# Patient Record
Sex: MALE | Race: WHITE | Employment: OTHER | URBAN - NONMETROPOLITAN AREA
[De-identification: names, ages, dates, MRNs, and addresses within clinical notes are randomized per-mention and may not be internally consistent; named-entity substitution may affect disease eponyms.]

---

## 2017-02-09 ENCOUNTER — HOSPITAL ENCOUNTER (OUTPATIENT)
Dept: SLEEP CENTER | Age: 52
Discharge: HOME | End: 2017-02-09
Payer: COMMERCIAL

## 2017-02-09 PROCEDURE — G0399 HOME SLEEP TEST/TYPE 3 PORTA: HCPCS

## 2017-02-09 PROCEDURE — 95806 SLEEP STUDY UNATT&RESP EFFT: CPT | Performed by: PSYCHIATRY & NEUROLOGY

## 2017-02-19 DIAGNOSIS — G47.33 SLEEP APNEA, OBSTRUCTIVE: Primary | ICD-10-CM

## 2017-03-13 ENCOUNTER — TELEPHONE (OUTPATIENT)
Dept: NEUROLOGY | Age: 52
End: 2017-03-13

## 2017-04-12 ENCOUNTER — OFFICE VISIT (OUTPATIENT)
Dept: NEUROLOGY | Age: 52
End: 2017-04-12
Payer: COMMERCIAL

## 2017-04-12 VITALS
BODY MASS INDEX: 37.93 KG/M2 | SYSTOLIC BLOOD PRESSURE: 112 MMHG | WEIGHT: 280 LBS | RESPIRATION RATE: 16 BRPM | DIASTOLIC BLOOD PRESSURE: 75 MMHG | HEART RATE: 80 BPM | HEIGHT: 72 IN

## 2017-04-12 DIAGNOSIS — Z99.89 CPAP (CONTINUOUS POSITIVE AIRWAY PRESSURE) DEPENDENCE: ICD-10-CM

## 2017-04-12 DIAGNOSIS — G47.33 OBSTRUCTIVE SLEEP APNEA: Primary | ICD-10-CM

## 2017-04-12 PROCEDURE — 99214 OFFICE O/P EST MOD 30 MIN: CPT | Performed by: PHYSICIAN ASSISTANT

## 2017-04-13 ENCOUNTER — TELEPHONE (OUTPATIENT)
Dept: NEUROLOGY | Age: 52
End: 2017-04-13

## 2017-07-03 ENCOUNTER — HOSPITAL ENCOUNTER (EMERGENCY)
Age: 52
Discharge: HOME | End: 2017-07-03
Attending: EMERGENCY MEDICINE
Payer: COMMERCIAL

## 2017-07-03 VITALS
HEIGHT: 72 IN | OXYGEN SATURATION: 99 % | WEIGHT: 280 LBS | DIASTOLIC BLOOD PRESSURE: 72 MMHG | SYSTOLIC BLOOD PRESSURE: 128 MMHG | RESPIRATION RATE: 16 BRPM | TEMPERATURE: 97.9 F | HEART RATE: 80 BPM | BODY MASS INDEX: 37.93 KG/M2

## 2017-07-03 DIAGNOSIS — R10.10 PAIN OF UPPER ABDOMEN: Primary | ICD-10-CM

## 2017-07-03 DIAGNOSIS — K21.9 GASTROESOPHAGEAL REFLUX DISEASE, ESOPHAGITIS PRESENCE NOT SPECIFIED: ICD-10-CM

## 2017-07-03 LAB
ALBUMIN SERPL-MCNC: 5 G/DL (ref 3.5–5.2)
ALP BLD-CCNC: 62 U/L (ref 40–130)
ALT SERPL-CCNC: 23 U/L (ref 5–41)
AMYLASE: 177 U/L (ref 28–100)
ANION GAP SERPL CALCULATED.3IONS-SCNC: 17 MMOL/L (ref 7–19)
AST SERPL-CCNC: 21 U/L (ref 5–40)
BASOPHILS ABSOLUTE: 0.1 K/UL (ref 0–0.2)
BASOPHILS RELATIVE PERCENT: 0.8 % (ref 0–1)
BILIRUB SERPL-MCNC: 0.5 MG/DL (ref 0.2–1.2)
BILIRUBIN URINE: NEGATIVE
BLOOD, URINE: NEGATIVE
BUN BLDV-MCNC: 13 MG/DL (ref 6–20)
CALCIUM SERPL-MCNC: 9.8 MG/DL (ref 8.6–10)
CHLORIDE BLD-SCNC: 100 MMOL/L (ref 98–111)
CLARITY: CLEAR
CO2: 24 MMOL/L (ref 22–29)
COLOR: YELLOW
CREAT SERPL-MCNC: 1.4 MG/DL (ref 0.5–1.2)
EOSINOPHILS ABSOLUTE: 0.3 K/UL (ref 0–0.6)
EOSINOPHILS RELATIVE PERCENT: 2.6 % (ref 0–5)
GFR NON-AFRICAN AMERICAN: 53
GLUCOSE BLD-MCNC: 106 MG/DL (ref 74–109)
GLUCOSE URINE: NEGATIVE MG/DL
HCT VFR BLD CALC: 50.3 % (ref 42–52)
HEMOGLOBIN: 16.8 G/DL (ref 14–18)
KETONES, URINE: NEGATIVE MG/DL
LEUKOCYTE ESTERASE, URINE: NEGATIVE
LIPASE: 37 U/L (ref 13–60)
LITHIUM LEVEL: 0.6 MMOL/L (ref 0.6–1.2)
LYMPHOCYTES ABSOLUTE: 1.2 K/UL (ref 1.1–4.5)
LYMPHOCYTES RELATIVE PERCENT: 11.9 % (ref 20–40)
MCH RBC QN AUTO: 31.3 PG (ref 27–31)
MCHC RBC AUTO-ENTMCNC: 33.4 G/DL (ref 33–37)
MCV RBC AUTO: 93.7 FL (ref 80–94)
MONOCYTES ABSOLUTE: 0.6 K/UL (ref 0–0.9)
MONOCYTES RELATIVE PERCENT: 6.2 % (ref 0–10)
NEUTROPHILS ABSOLUTE: 8.1 K/UL (ref 1.5–7.5)
NEUTROPHILS RELATIVE PERCENT: 78 % (ref 50–65)
NITRITE, URINE: NEGATIVE
PDW BLD-RTO: 13.9 % (ref 11.5–14.5)
PH UA: 6
PLATELET # BLD: 219 K/UL (ref 130–400)
PMV BLD AUTO: 9.8 FL (ref 9.4–12.4)
POTASSIUM SERPL-SCNC: 3.9 MMOL/L (ref 3.5–5)
PROTEIN UA: NEGATIVE MG/DL
RBC # BLD: 5.37 M/UL (ref 4.7–6.1)
SODIUM BLD-SCNC: 141 MMOL/L (ref 136–145)
SPECIFIC GRAVITY UA: 1.02
TOTAL PROTEIN: 9.1 G/DL (ref 6.6–8.7)
TROPONIN: <0.01 NG/ML (ref 0–0.03)
UROBILINOGEN, URINE: 0.2 E.U./DL
WBC # BLD: 10.3 K/UL (ref 4.8–10.8)

## 2017-07-03 PROCEDURE — 80178 ASSAY OF LITHIUM: CPT

## 2017-07-03 PROCEDURE — 96374 THER/PROPH/DIAG INJ IV PUSH: CPT

## 2017-07-03 PROCEDURE — 80053 COMPREHEN METABOLIC PANEL: CPT

## 2017-07-03 PROCEDURE — S0028 INJECTION, FAMOTIDINE, 20 MG: HCPCS | Performed by: EMERGENCY MEDICINE

## 2017-07-03 PROCEDURE — 2500000003 HC RX 250 WO HCPCS: Performed by: EMERGENCY MEDICINE

## 2017-07-03 PROCEDURE — 93005 ELECTROCARDIOGRAM TRACING: CPT

## 2017-07-03 PROCEDURE — 85025 COMPLETE CBC W/AUTO DIFF WBC: CPT

## 2017-07-03 PROCEDURE — 6370000000 HC RX 637 (ALT 250 FOR IP): Performed by: EMERGENCY MEDICINE

## 2017-07-03 PROCEDURE — 99284 EMERGENCY DEPT VISIT MOD MDM: CPT

## 2017-07-03 PROCEDURE — 82150 ASSAY OF AMYLASE: CPT

## 2017-07-03 PROCEDURE — 81003 URINALYSIS AUTO W/O SCOPE: CPT

## 2017-07-03 PROCEDURE — 84484 ASSAY OF TROPONIN QUANT: CPT

## 2017-07-03 PROCEDURE — 36415 COLL VENOUS BLD VENIPUNCTURE: CPT

## 2017-07-03 PROCEDURE — 83690 ASSAY OF LIPASE: CPT

## 2017-07-03 PROCEDURE — 99283 EMERGENCY DEPT VISIT LOW MDM: CPT | Performed by: EMERGENCY MEDICINE

## 2017-07-03 RX ORDER — SUCRALFATE 1 G/1
1 TABLET ORAL 4 TIMES DAILY
Qty: 60 TABLET | Refills: 0 | Status: SHIPPED | OUTPATIENT
Start: 2017-07-03 | End: 2017-10-12 | Stop reason: ALTCHOICE

## 2017-07-03 RX ADMIN — FAMOTIDINE 20 MG: 10 INJECTION, SOLUTION INTRAVENOUS at 21:47

## 2017-07-03 RX ADMIN — Medication 30 ML: at 21:47

## 2017-07-03 ASSESSMENT — ENCOUNTER SYMPTOMS
SORE THROAT: 0
RHINORRHEA: 0
COUGH: 0
EYES NEGATIVE: 1
WHEEZING: 0
NAUSEA: 1
DIARRHEA: 0
VOMITING: 0
CHEST TIGHTNESS: 0
ABDOMINAL PAIN: 1
SHORTNESS OF BREATH: 0
BACK PAIN: 0

## 2017-07-03 ASSESSMENT — PAIN SCALES - GENERAL
PAINLEVEL_OUTOF10: 0
PAINLEVEL_OUTOF10: 7

## 2017-07-03 ASSESSMENT — PAIN DESCRIPTION - LOCATION: LOCATION: ABDOMEN

## 2017-07-03 ASSESSMENT — PAIN DESCRIPTION - ORIENTATION: ORIENTATION: UPPER

## 2017-07-03 ASSESSMENT — PAIN DESCRIPTION - DESCRIPTORS: DESCRIPTORS: ACHING

## 2017-07-10 LAB
EKG P AXIS: 61 DEGREES
EKG P-R INTERVAL: 184 MS
EKG Q-T INTERVAL: 428 MS
EKG QRS DURATION: 104 MS
EKG QTC CALCULATION (BAZETT): 435 MS
EKG T AXIS: 33 DEGREES

## 2017-07-13 ENCOUNTER — HOSPITAL ENCOUNTER (EMERGENCY)
Age: 52
Discharge: LEFT BEFORE DISCHARGE | End: 2017-07-13
Payer: COMMERCIAL

## 2017-07-13 PROCEDURE — 4500000002 HC ER NO CHARGE

## 2017-10-12 ENCOUNTER — OFFICE VISIT (OUTPATIENT)
Dept: NEUROLOGY | Age: 52
End: 2017-10-12
Payer: COMMERCIAL

## 2017-10-12 VITALS
WEIGHT: 284 LBS | DIASTOLIC BLOOD PRESSURE: 84 MMHG | SYSTOLIC BLOOD PRESSURE: 135 MMHG | HEIGHT: 72 IN | HEART RATE: 69 BPM | OXYGEN SATURATION: 96 % | BODY MASS INDEX: 38.47 KG/M2

## 2017-10-12 DIAGNOSIS — G47.33 OBSTRUCTIVE SLEEP APNEA: Primary | ICD-10-CM

## 2017-10-12 DIAGNOSIS — Z99.89 BIPAP (BIPHASIC POSITIVE AIRWAY PRESSURE) DEPENDENCE: ICD-10-CM

## 2017-10-12 PROCEDURE — 99213 OFFICE O/P EST LOW 20 MIN: CPT | Performed by: PHYSICIAN ASSISTANT

## 2017-10-12 RX ORDER — PIOGLITAZONEHYDROCHLORIDE 30 MG/1
15 TABLET ORAL DAILY
Refills: 0 | COMMUNITY
Start: 2017-09-19

## 2017-10-12 NOTE — PROGRESS NOTES
[facility] Sleep Follow Up Encounter      Information:   Patient Name: Jaylin Kelley  :   1965  Age:   45 y.o. MRN:   [id number]  Account #:  [de-identified]  Today:                10/12/17    Provider:  Andrew Kan PA-C    Chief Complaint   Patient presents with    Sleep Apnea     Patient states he is doing well. Subjective:   Jaylin Kelley is a 45 y.o. man  with a history of severe UMM who comes in for a sleep clinic follow up. He was initially diagnosed with UMM in  and was prescribed CPAP and was intolerant to the pressure and stopped using it. He had persistent symptoms of UMM and decided to be re-evaluated. He was later referred for an HST. The HST, 2017 revealed an AHI of 73.3. He was prescribed auto CPAP but he continued to use old device, which is a BiPAP according to the download. Today he reports that he is using BiPAP 9  hours per night. He sleeps 9 hours per night as well. The pre-PAP symptoms have improved. He has some drowsiness in the morning which is alleviated by coffee.  He does not fall asleep when driving.        Location or symptom:  UMM  Onset:  PS and HST:  2017  Timing:  q hs  Severity:  Severe  Associated:  Snoring, witnessed apneas, and excessive daytime somnolence  Alleviated:  CPAP      Objective:     Past Medical History:   Diagnosis Date    BiPAP (biphasic positive airway pressure) dependence     20cm/15cm    Bipolar 1 disorder (HCC)     Depression     GERD (gastroesophageal reflux disease)     Hyperlipidemia     Hypertension     pt staes he takes no bp medication    Obstructive sleep apnea     AHI:  73.3 per HST, 2017    Type II or unspecified type diabetes mellitus without mention of complication, not stated as uncontrolled     Unspecified sleep apnea        Past Surgical History:   Procedure Laterality Date    COLONOSCOPY  2012    Dr Ghulam Pretty 2016    Dr Paige-external hemorrhoids, tubular AP (-) dysplasia x 2--5 yr []Abdominal Pain  []Blood in Stool  []Diarrhea []Constipation []Nausea  []Vomiting  [x] Denies all unless marked  Genitourinary:  [] Dysuria [] Frequency  [] Incontinence [] Urgency   [x] Denies all unless marked  Musculoskeletal: [] Arthralgia  [] Myalgias [] Muscle cramps  [] Muscle twitches   [x] Denies all unless marked   Extremities:   [] Pain   [] Swelling   [x] Denies all unless marked  Skin:[] Rash  [] Color Change  [x] Denies all unless marked  Neurological:[] Visual Disturbance [] Double Vision [] Slurred Speech [] Trouble swallowing  [] Vertigo [] Tingling [] Numbness [] Weakness [] Loss of Balance   [] Loss of Consciousness [] Memory Loss  [x] Denies all unless marked  Psychiatric/Behavioral:[x] Depression [x] Anxiety  [] Denies all unless marked  Sleep: []  Insomnia [] Sleep Disturbance [] Snoring [] Restless Legs [] Daytime Sleepiness [] Sleep Apnea  [x] Denies all unless marked       Examination:  Vitals:  /84   Pulse 69   Ht 6' (1.829 m)   Wt 284 lb (128.8 kg)   SpO2 96%   BMI 38.52 kg/m²   General appearance:  Appears healthy. Alert; in no acute distress. Pleasant. , alert and cooperative with exam  HEENT:  PERRLA, EOMI and Neck supple with midline trachea  Heart[de-identified]  regular rate and rhythm, S1, S2 normal, no murmur, click, rub or gallop  Lungs:  clear to auscultation bilaterally  Extremities:  extremities normal, atraumatic, no cyanosis or edema  Neurologic:  Extraocular movements are intact without nystagmus. Visual fields are full to confrontation. Facial movements are symmetrical and normal.  Speech is precise. Extremity strength is normal in both uppers and lowers. Deep tendon reflexes are intact and symmetrical.  Rapid alternating movements are unimpaired. Finger-to-nose testing is performed well, without dysmetria. Gait is normal.    I reviewed the following studies:    Compliance download:  He has an auto CPAP set at a pressure range of 10cm to 20cm.   Compliance download shows that he uses device:  100% of the time;  percentage of days with usage >=4 hours:  97%. AHI:  0.3    Assessment:       ICD-10-CM ICD-9-CM    1. Obstructive sleep apnea G47.33 327.23    2. BiPAP (biphasic positive airway pressure) dependence Z99.89 V46.8           []  :  Stable     []  :  Improved                       [x]  :  Well controlled              []  :  Resolving     []  :  Resolved     []  :  Inadequately controlled     []  :  Worsening    Patient is compliant and benefiting from therapy as indicated by compliance evaluation and patient report. Plan:     No orders of the defined types were placed in this encounter. 1.   Patient advised of the etiology,  pathophysiology, diagnosis, treatment options, and risks of untreated UMM. Risks may include, but are not limited to  hypertension, coronary artery disease, diabetes, stroke, weight gain, impaired cognition, daytime somnolence,  and motor vehicle accidents. Advised to abstain from driving or operating heavy machinery when drowsy and the use of respiratory suppressants. 2.  The following educational material has been included in this visit after visit summary for your review: UMM/PAP guidelines-Discussed with the patient and all questions fully answered. 3.  The current medical regimen is effective;  continue present plan. 4.  Continue BiPAP  5. Follow up in 1 year      Note:  A total of >50% (>8 minutes) of 15 minutes was spent discussing the pathophysiology and treatment and/or coordination of care of the above diagnoses.

## 2017-10-12 NOTE — PATIENT INSTRUCTIONS
What is sleep apnea?  Sleep apnea is a condition that makes you stop breathing for short periods while you are asleep. There are 2 types of sleep apnea. One is called \"obstructive sleep apnea,\" and the other is called \"central sleep apnea. \"  In obstructive sleep apnea, you stop breathing because your throat narrows or closes. In central sleep apnea, you stop breathing because your brain does not send the right signals to your muscles to make you breathe. When people talk about sleep apnea, they are usually referring to obstructive sleep apnea, which is what this article is about. People with sleep apnea do not know that they stop breathing when they are asleep. But they do sometimes wake up startled or gasping for breath. They also often hear from loved ones that they snore. What are the symptoms of sleep apnea?  The main symptoms of sleep apnea are loud snoring, tiredness, and daytime sleepiness. Other symptoms can include:  ?Restless sleep  ? Waking up choking or gasping  ? Morning headaches, dry mouth, or sore throat  ? Waking up often to urinate  ? Waking up feeling unrested or groggy  ? Trouble thinking clearly or remembering things  Some people with sleep apnea don't have symptoms, or they don't know they have them. They might figure that it's normal to be tired or to snore a lot. Should I see a doctor or nurse?  Yes. If you think you might have sleep apnea, see your doctor. Is there a test for sleep apnea?  Yes. If your doctor or nurse suspects you have sleep apnea, he or she might send you for a \"sleep study. \" Sleep studies can sometimes be done at home, but they are usually done in a sleep lab. For the study, you spend the night in the lab, and you are hooked up to different machines that monitor your heart rate, breathing, and other body functions. The results of the test will tell your doctor or nurse if you have the disorder. Is there anything I can do on my own to help my sleep apnea? other serious heart problems. In people with severe sleep apnea, getting treated (for example, with a CPAP machine) can help prevent some of these problems. Important information:  Medicare/private insurance CPAP/BiPAP/APAP requirements:  Medicare/private insurance has specific requirements for PAP compliance that must be met during the first 90 days of use to continue coverage for CPAP/BiPAP/APAP  from day 91 and beyond. The policy requires that patients use a PAP device 4 hours per 24 hour period, at least 70% of the time over a 30 day period. This data must be downloaded as a report direct from the PAP devices. This is called a compliance download. Your PAP supplier will assist you in this matter. Reminder:  Please bring a copy of the compliance download to your next office visit or have your supplier fax it to our office prior to your office visit. Note:  Where applicable, we will utilize PAP device efficiency reports, additional testing, and face-to-face  clinical evaluation subsequent to any treatment, changes in treatment, and continued treatment. Caution:  Please abstain from driving or engaging in other activities which may be hazardous in the presence of diminished alertness or daytime drowsiness. And avoid the use of sedatives or alcohol, which can worsen sleep apnea and daytime drowsiness. Mask suggestions:  - Resmed Airfit N20 (Nasal) or F20 (Full face mask). They conform to your face, thus decreasing the potential for mask leakage. You might like the [employer] (full face mask). It has a \"memory foam\" like cushion. You might also like to try a nasal mask called a Dreamwear nasal mask or the Dreamwear nasal pillow. One other suggestion is the [facility], it is a minimal contact full face mask.   The [employer] incredible under the nose design makes it the only full face mask that won't cause red marks on the bridge of your nose when compared to other Full Face

## 2018-10-11 NOTE — PROGRESS NOTES
BY MOUTH TWICE A DAY WITH FOOD  3    FISH OIL 2,000 mg by Does not apply route 2 times daily       Ascorbic Acid (VITAMIN C) 500 MG tablet Take 1,000 mg by mouth daily        No current facility-administered medications for this visit. Allergies:  Neosporin [bacitracin-neomycin-polymyxin]    REVIEW OF SYSTEMS     Constitutional: []Fever []Sweats []Chills [] Recent Injury   [x] Denies all unless marked  HENT:[]Headache  [] Head Injury  [] Sore Throat  [] Ear Pain  [] Dizziness [] Hearing Loss   [x] Denies all unless marked  Spine:  [] Neck pain  [] Back pain  [] Sciaticia  [x] Denies all unless marked  Cardiovascular:[]Chest Pain []Palpitations [] Heart Disease  [x] Denies all unless marked  Pulmonary: []Shortness of Breath []Cough   [x] Denies all unless marked  Gastrointestinal:  []Abdominal Pain  []Blood in Stool  []Diarrhea []Constipation []Nausea  []Vomiting  [x] Denies all unless marked  Genitourinary:  [] Dysuria [] Frequency  [] Incontinence [] Urgency   [x] Denies all unless marked  Musculoskeletal: [] Arthralgia  [] Myalgias [] Muscle cramps  [] Muscle twitches   [x] Denies all unless marked   Extremities:   [] Pain   [] Swelling   [x] Denies all unless marked  Skin:[] Rash  [] Color Change  [x] Denies all unless marked  Neurological:[] Visual Disturbance [] Double Vision [] Slurred Speech [] Trouble swallowing  [] Vertigo [] Tingling [] Numbness [] Weakness [] Loss of Balance   [] Loss of Consciousness [] Memory Loss [] Seizures  [x] Denies all unless marked  Psychiatric/Behavioral:[] Depression [] Anxiety  [x] Denies all unless marked  Sleep: []  Insomnia [] Sleep Disturbance [] Snoring [] Restless Legs [x] Daytime Sleepiness [x] Sleep Apnea  [x] Denies all unless marked    The MA has completed the ROS with the patient. I have reviewed it in its' entirety with the patient and agree with the documentation.        PHYSICAL EXAM  /72   Pulse 109   Ht 6' (1.829 m)   Wt 294 lb (133.4 kg) complication, without long-term current use of insulin (HCC) E11.9 Hemoglobin A1C          []  :  Stable     []  :  Improved                       []  :  Well controlled              []  :  Resolving     []  :  Resolved     []  :  Inadequately controlled     []  :  Worsening     [x]  :  Additional workup planned    Patient is compliant and benefiting from therapy as indicated by compliance evaluation but with recurrent daytime somnolence-discussed other etiologies for daytime fatigue at length. Plan:     No orders of the defined types were placed in this encounter. 1.   Patient advised of the etiology,  pathophysiology, diagnosis, treatment options, and risks of untreated UMM. Risks may include, but are not limited to  hypertension, coronary artery disease, diabetes, stroke, weight gain, impaired cognition, daytime somnolence,  and motor vehicle accidents. Advised to abstain from driving or operating heavy machinery when drowsy and the use of respiratory suppressants. 2.  The following educational material has been included in this visit after visit summary for your review: UMM/PAP guidelines/diabetic diet-Discussed with the patient and all questions fully answered. 3.  Order-labs (he will follow up with Dr. Sosa Mask  4. Discussed diabetic diet and discontinuing the frosted flakes for breakfast  5. Continue CPAP  6. Consider an in-lab PSG (split-night) to reassess if symptoms persist  7. Follow up in 1 year-pt request but he will follow up sooner if EDS persists      Note:  A total of >50% (>13 minutes) of 25 minutes was spent discussing the pathophysiology and treatment and/or coordination of care of the above diagnoses.

## 2018-10-12 ENCOUNTER — OFFICE VISIT (OUTPATIENT)
Dept: NEUROLOGY | Age: 53
End: 2018-10-12
Payer: COMMERCIAL

## 2018-10-12 VITALS
HEART RATE: 109 BPM | HEIGHT: 72 IN | DIASTOLIC BLOOD PRESSURE: 72 MMHG | WEIGHT: 294 LBS | OXYGEN SATURATION: 95 % | SYSTOLIC BLOOD PRESSURE: 105 MMHG | BODY MASS INDEX: 39.82 KG/M2

## 2018-10-12 DIAGNOSIS — E34.9 TESTOSTERONE DEFICIENCY: ICD-10-CM

## 2018-10-12 DIAGNOSIS — G47.33 OBSTRUCTIVE SLEEP APNEA: Primary | ICD-10-CM

## 2018-10-12 DIAGNOSIS — R40.0 SOMNOLENCE, DAYTIME: ICD-10-CM

## 2018-10-12 DIAGNOSIS — Z99.89 BIPAP (BIPHASIC POSITIVE AIRWAY PRESSURE) DEPENDENCE: ICD-10-CM

## 2018-10-12 DIAGNOSIS — E11.9 TYPE 2 DIABETES MELLITUS WITHOUT COMPLICATION, WITHOUT LONG-TERM CURRENT USE OF INSULIN (HCC): ICD-10-CM

## 2018-10-12 PROCEDURE — 99214 OFFICE O/P EST MOD 30 MIN: CPT | Performed by: PHYSICIAN ASSISTANT

## 2018-10-12 NOTE — PATIENT INSTRUCTIONS
takes several deep breaths to catch up on breathing. As the person awakens, he or she may move briefly, snort or snore, and take a deep breath. Less frequently, a person may awaken completely with a sensation of gasping, smothering, or choking. If the person falls back to sleep quickly, he or she will not remember the event. Many people with sleep apnea are unaware of their abnormal breathing in sleep, and all patients underestimate how often their sleep is interrupted. Awakening from sleep causes sleep to be unrefreshing and causes fatigue and daytime sleepiness. Anatomic causes of obstructive sleep apnea --  Most patients have UMM because of a small upper airway. As the bones of the face and skull develop, some people develop a small lower face, a small mouth, and a tongue that seems too large for the mouth. These features are genetically determined, which explains why UMM tends to cluster in families. Obesity is another major factor. Tonsil enlargement can be an important cause, especially in children. SLEEP APNEA SYMPTOMS -- The main symptoms of UMM are loud snoring, fatigue, and daytime sleepiness. However, some people have no symptoms. For example, if the person does not have a bed partner, he or she may not be aware of the snoring. Fatigue and sleepiness have many causes and are often attributed to overwork and increasing age. As a result, a person may be slow to recognize that they have a problem. A bed partner or spouse often prompts the patient to seek medical care. Other symptoms may include one or more of the following:  ?Restless sleep  ? Awakening with choking, gasping, or smothering  ? Morning headaches, dry mouth, or sore throat  ? Waking frequently to urinate  ? Awakening unrested, groggy  ? Low energy, difficulty concentrating, memory impairment    Risk factors -- Certain factors increase the risk of sleep apnea.   ?Increasing age - UMM occurs at all ages, but it is more common in middle and period. This data must be downloaded as a report direct from the PAP devices. This is called a compliance download. Your PAP supplier will assist you in this matter. Reminder:  Please bring a copy of the compliance download to your next office visit or have your supplier fax it to our office prior to your office visit. Note:  Where applicable, we will utilize PAP device efficiency reports, additional testing, and face-to-face  clinical evaluation subsequent to any treatment, changes in treatment, and continued treatment. Caution:  Please abstain from driving or engaging in other activities which may be hazardous in the presence of diminished alertness or daytime drowsiness. And avoid the use of sedatives or alcohol, which can worsen sleep apnea and daytime drowsiness. Mask suggestions:  - Resmed Airfit N20 (Nasal) or F20 (Full face mask). They conform to your face, thus decreasing the potential for mask leakage. You might like the [employer] (full face mask). It has a \"memory foam\" like cushion. You might also like to try a nasal mask called a Dreamwear nasal mask or the Dreamwear nasal pillow. One other suggestion is the [facility], it is a minimal contact full face mask. The [employer] [place] incredible under the nose design makes it the only full face mask that won't cause red marks on the bridge of your nose when compared to other full face masks. The newest mask available is a Dreamwear full face mask. It has a soft feel, unique in-frame air-flow, and innovative air tube connection at the top of the head for the ultimate in sleep comfort. Patient Education        Learning About Diabetes Food Guidelines  Your Care Instructions    Meal planning is important to manage diabetes. It helps keep your blood sugar at a target level (which you set with your doctor). You don't have to eat special foods. You can eat what your family eats, including sweets once in a while.  But you do have to pay

## 2019-05-15 ENCOUNTER — HOSPITAL ENCOUNTER (OUTPATIENT)
Dept: ULTRASOUND IMAGING | Age: 54
Discharge: HOME | End: 2019-05-15
Payer: COMMERCIAL

## 2019-05-15 DIAGNOSIS — R79.89 ELEVATED SERUM CREATININE: ICD-10-CM

## 2019-05-15 PROCEDURE — 76770 US EXAM ABDO BACK WALL COMP: CPT

## 2019-10-10 ENCOUNTER — TELEPHONE (OUTPATIENT)
Dept: NEUROLOGY | Age: 54
End: 2019-10-10

## 2019-10-14 ENCOUNTER — OFFICE VISIT (OUTPATIENT)
Dept: NEUROLOGY | Age: 54
End: 2019-10-14
Payer: COMMERCIAL

## 2019-10-14 VITALS
WEIGHT: 277 LBS | HEIGHT: 72 IN | DIASTOLIC BLOOD PRESSURE: 81 MMHG | OXYGEN SATURATION: 95 % | BODY MASS INDEX: 37.52 KG/M2 | SYSTOLIC BLOOD PRESSURE: 124 MMHG | HEART RATE: 77 BPM

## 2019-10-14 DIAGNOSIS — G47.33 OBSTRUCTIVE SLEEP APNEA: Primary | ICD-10-CM

## 2019-10-14 DIAGNOSIS — Z99.89 BIPAP (BIPHASIC POSITIVE AIRWAY PRESSURE) DEPENDENCE: ICD-10-CM

## 2019-10-14 PROCEDURE — 99213 OFFICE O/P EST LOW 20 MIN: CPT | Performed by: PHYSICIAN ASSISTANT

## 2019-10-14 RX ORDER — IRBESARTAN 75 MG/1
TABLET ORAL
COMMUNITY
Start: 2019-09-03

## 2020-10-14 ENCOUNTER — OFFICE VISIT (OUTPATIENT)
Dept: NEUROLOGY | Age: 55
End: 2020-10-14
Payer: COMMERCIAL

## 2020-10-14 VITALS
DIASTOLIC BLOOD PRESSURE: 68 MMHG | BODY MASS INDEX: 35.21 KG/M2 | RESPIRATION RATE: 20 BRPM | HEIGHT: 72 IN | HEART RATE: 68 BPM | WEIGHT: 260 LBS | SYSTOLIC BLOOD PRESSURE: 103 MMHG

## 2020-10-14 PROCEDURE — 99214 OFFICE O/P EST MOD 30 MIN: CPT | Performed by: PHYSICIAN ASSISTANT

## 2020-10-14 NOTE — PATIENT INSTRUCTIONS
Patient education: Sleep apnea in adults       INTRODUCTION -- Normally during sleep, air moves through the throat and in and out of the lungs at a regular rhythm. In a person with sleep apnea, air movement is periodically diminished or stopped. There are two types of sleep apnea: obstructive sleep apnea and central sleep apnea. In obstructive sleep apnea, breathing is abnormal because of narrowing or closure of the throat. In central sleep apnea, breathing is abnormal because of a change in the breathing control and rhythm. Sleep apnea is a serious condition that can affect a person's ability to safely perform normal daily activities and can affect long term health. Approximately 25 percent of adults are at risk for sleep apnea of some degree. Men are more commonly affected than women. Other risk factors include middle and older age, being overweight or obese, and having a small mouth and throat. This topic review focuses on the most common type of sleep apnea in adults, obstructive sleep apnea (UMM). HOW SLEEP APNEA OCCURS -- The throat is surrounded by muscles that control the airway for speaking, swallowing, and breathing. During sleep, these muscles are less active, and this causes the throat to narrow. In most people, this narrowing does not affect breathing. In others, it can cause snoring, sometimes with reduced or completely blocked airflow. A completely blocked airway without airflow is called an obstructive apnea. Partial obstruction with diminished airflow is called a hypopnea. A person may have apnea and hypopnea during sleep. Insufficient breathing due to apnea or hypopnea causes oxygen levels to fall and carbon dioxide to rise. Because the airway is blocked, breathing faster or harder does not help to improve oxygen levels until the airway is reopened. Typically, the obstruction requires the person to awaken to activate the upper airway muscles.  Once the airway is opened, the person then takes several deep breaths to catch up on breathing. As the person awakens, he or she may move briefly, snort or snore, and take a deep breath. Less frequently, a person may awaken completely with a sensation of gasping, smothering, or choking. If the person falls back to sleep quickly, he or she will not remember the event. Many people with sleep apnea are unaware of their abnormal breathing in sleep, and all patients underestimate how often their sleep is interrupted. Awakening from sleep causes sleep to be unrefreshing and causes fatigue and daytime sleepiness. Anatomic causes of obstructive sleep apnea --  Most patients have UMM because of a small upper airway. As the bones of the face and skull develop, some people develop a small lower face, a small mouth, and a tongue that seems too large for the mouth. These features are genetically determined, which explains why UMM tends to cluster in families. Obesity is another major factor. Tonsil enlargement can be an important cause, especially in children. SLEEP APNEA SYMPTOMS -- The main symptoms of UMM are loud snoring, fatigue, and daytime sleepiness. However, some people have no symptoms. For example, if the person does not have a bed partner, he or she may not be aware of the snoring. Fatigue and sleepiness have many causes and are often attributed to overwork and increasing age. As a result, a person may be slow to recognize that they have a problem. A bed partner or spouse often prompts the patient to seek medical care. Other symptoms may include one or more of the following:  ?Restless sleep  ? Awakening with choking, gasping, or smothering  ? Morning headaches, dry mouth, or sore throat  ? Waking frequently to urinate  ? Awakening unrested, groggy  ? Low energy, difficulty concentrating, memory impairment    Risk factors -- Certain factors increase the risk of sleep apnea.   ?Increasing age - UMM occurs at all ages, but it is more common in middle and weight loss is only 5 percent, meaning that 95 percent of people regain lost weight. Avoid alcohol and other sedatives -- Alcohol can worsen sleepiness, potentially increasing the risk of accidents or injury. People with UMM are often counseled to drink little to no alcohol, even during the daytime. Similarly, people who take anti-anxiety medications or sedatives to sleep should speak with their healthcare provider about the safety of these medications. People with UMM must notify all healthcare providers, including surgeons, about their condition and the potential risks of being sedated. People with UMM who are given anesthesia and/or pain medications require special management and close monitoring to reduce the risk of a blocked airway. Dental devices -- A dental device, called an oral appliance or mandibular advancement device, can reposition the jaw (mandible), bringing the tongue and soft palate forward as well. This may relieve obstruction in some people. This treatment is excellent for reducing snoring, although the effect on MUM is sometimes more limited. As a result, dental devices are best used for mild cases of UMM when relief of snoring is the main goal. Failure to tolerate and accept CPAP is another indication for dental devices. While dental devices are not as effective as CPAP for UMM, some patients prefer a dental device to CPAP. Side effects of dental devices are generally minor but may include changes to the bite with prolonged use. Surgical treatment -- Surgery is an alternative therapy for patients who cannot tolerate or do not improve with nonsurgical treatments such as CPAP or oral devices. Surgery can also be used in combination with other nonsurgical treatments. Surgical procedures reshape structures in the upper airways or surgically reposition bone or soft tissue. Uvulopalatopharyngoplasty (UPPP) removes the uvula and excessive tissue in the throat, including the tonsils, if present. Other procedures, such as maxillomandibular advancement (MMA), address both the upper and lower pharyngeal airway more globally. UPPP alone has limited success rates (less than 50 percent) and people can relapse (when UMM symptoms return after surgery). As a result, this surgery is only recommended in a minority of people and should be considered with caution. MMA may have a higher success rate, particularly in people with abnormal jaw (maxilla and mandible) anatomy, but it is the most complicated procedure. A newer surgical approach, nerve stimulation to protrude the tongue, has promising success rates in very selected people. Tracheostomy creates a permanent opening in the neck. It is reserved for people with severe disease in whom less drastic measures have failed or are inappropriate. Although it is always successful in eliminating obstructive sleep apnea, tracheostomy requires significant lifestyle changes and carries some serious risks (eg, infection, bleeding, blockage). All surgical treatments require discussions about the goals of treatment, the expected outcomes, and potential complications. Hypoglossal nerve stimulator- \"Inspire\" device    PAP treatment failure:  Possible causes of treatment failure include nonadherence or suboptimal adherence, weight gain, an inappropriate level of prescribed positive pressure, or an additional disorder causing sleepiness (eg, narcolepsy) that may require alterations in the therapeutic regimen. A review of medications should also be undertaken since many drugs may lead to sleepiness. Inadequate sleep time may also negate the expected effects from treatment of UMM. Also, pt's can have persistent hypersomnolence associated with sleep apnea even in the presence of adequate therapy and at those times Provigil or Nuvigil or other stimulants may be indicated.     Once the patient's positive airway pressure therapy has been optimized and symptoms resolved, a regimen of long-term follow-up should be established. Annual visits are reasonable, with more frequent visits in between if new issues arise. The purpose of long-term follow-up is to assess usage and monitor for recurrent UMM, new side effects, air leakage, and fluctuations in body weight. WHERE TO GET MORE INFORMATION -- Your healthcare provider is the best source of information for questions and concerns related to your medical problem. Organizations  American Sleep Apnea Association  Provides information about sleep apnea to the public, publishes a newsletter, and serves as an advocate for people with the disorder. [place], [address], [address] [place] [address]   Mike@[email or web address]. org   [email or web address]   Tel: [phone]   Fax: [facility] organization that works to [employer] and safety by promoting public understanding of sleep and sleep disorders. Supports sleep-related education, research, and advocacy; produces and distributes educational materials to the public and healthcare professionals; and offers postdoctoral fellowships and grants for sleep researchers. [address]ney[address]   Serina@[email or web address]. org   [email or web address] org   Tel: [phone]   Fax: [phone]    Important information:  Medicare/private insurance CPAP/BiPAP/APAP requirements:  Medicare/private insurance has specific requirements for PAP compliance that must be met during the first 90 days of use to continue coverage for CPAP/BiPAP/APAP  from day 91 and beyond. The policy requires that patients use a PAP device 4 hours per 24 hour period, at least 70% of the time over a 30 day period. This data must be downloaded as a report direct from the PAP devices. This is called a compliance download. Your PAP supplier will assist you in this matter.      Reminder:  Please bring a copy of the compliance download to your next office visit or have your supplier fax it to our office prior to your office visit. Note:  Where applicable, we will utilize PAP device efficiency reports, additional testing, and face-to-face  clinical evaluation subsequent to any treatment, changes in treatment, and continued treatment. Caution:  Please abstain from driving or engaging in other activities which may be hazardous in the presence of diminished alertness or daytime drowsiness. And avoid the use of sedatives or alcohol, which can worsen sleep apnea and daytime drowsiness. Mask suggestions:  -     Resmed Airfit N20 (Nasal) or F20 (Full face mask). They conform to your face, thus decreasing the potential for mask leakage. You might like the AirTouch F20(full face mask). It has a \"memory foam\" like cushion. The AirFit F30 is a smaller style full face mask designed to sit low on and cover less of your face for fewer facial marks. AirFit N30i has a top of the head tube with a nasal mask. AirFit P10 reported to be the most comfortable nasal pillow mask. Resmed Mirage FX reported to be the most comfortable nasal mask. Resmed Mirage [place] reported to be the most comfortable hybrid mask. AirTouch N20-memory foam nasal mask. Respironics: You might also like to try a nasal mask called a Dreamwear nasal mask or the Dreamwear nasal pillow. Another suggestion is the [facility], it is a minimal contact full face mask. The Arnett Bill incredible under the nose design makes it the only full face mask that won't cause red marks on the bridge of your nose when compared to other full face masks. The Dreamwear full face mask has a  soft feel, unique in-frame air-flow, and innovative air tube connection at the top of the head for the ultimate in sleep comfort. Comfort Gel Blue. Dreamwear gel pillows.     Narinder & Jah: Roxy Craft nasal pillow mask and Simplus FFM    The use of a memory foam CPAP pillow supports the head and neck throughout the night.

## 2020-10-14 NOTE — PROGRESS NOTES
[facility] Sleep Follow Up Encounter      Information:   Patient Name: Kali Rice  :   1965  Age:   60 y.o. MRN:   [id number]  Account #:  [de-identified]  Today:                10/14/20    Provider:  Mitch Niño PA-C    Chief Complaint   Patient presents with    Follow-up    Sleep Apnea        Subjective:   Kali Rice is a 60 y.o. male  with a history of severe UMM  who comes in for an annual sleep clinic follow up. In review, he was initially diagnosed with UMM in  and was prescribed CPAP and was intolerant to the pressure and stopped using it. He had persistent symptoms of UMM and decided to be re-evaluated. He was later referred for an HST. The HST, 2017 revealed an AHI of 73.3.  He was prescribed auto CPAP but he continued to use old device, which is a BiPAP according to the download. He thinks that the BiPAP is greater than 8years old. The BiPAP is set at a pressure of 20cm/15cm. The compliance report indicates that he is averaging 10.5  hours of BiPAP use per day. He sleeps 9 hours per night. He uses nasal mask and he opened his mouth. He has started using a chin strap. He has frequent awakenings from 4:00 to  6:00. He c/o excessive daytime somnolence. His sleep is non-restorative. The EDS starts at 8:00 am. He falls asleep at his computer at work. He does not fall asleep while driving. By mid-day the daytime somnolence improves until the evening. The RLS is stable with clonazepam. It sometimes recurs but usually it is stable. The current BiPAP is outdated. Location or symptom:  UMM  Onset:  PS and HST:  2017  Timing:  q hs  Severity:  Severe  Associated:  Snoring, witnessed apneas, and excessive daytime somnolence  Alleviated:  BiPAP    He has dyskinesias that are associated with Geodon.  He was prescribed [place] but he continues to have dyskinesias in the legs and mouth.        Objective:     Past Medical History:   Diagnosis Date    BiPAP (biphasic positive airway pressure)  FISH OIL 2,000 mg by Does not apply route 2 times daily       RaNITidine HCl (ZANTAC PO) Take by mouth      Ascorbic Acid (VITAMIN C) 500 MG tablet Take 1,000 mg by mouth daily        No current facility-administered medications for this visit. Allergies:  Neosporin [bacitracin-neomycin-polymyxin]    REVIEW OF SYSTEMS    Constitutional: []Fever []Sweats []Chills [] Recent Injury   [x] Denies all unless marked  HENT:[]Headache  [] Head Injury  [] Sore Throat  [] Ear Pain  [] Dizziness [] Hearing Loss   [x] Denies all unless marked  Musculoskeletal: [] Arthralgia  [] Myalgias [] Muscle cramps  [] Muscle twitches   [x] Denies all unless marked   Spine:  [] Neck pain  [] Back pain  [] Sciaticia  [x] Denies all unless marked  Neurological:[] Visual Disturbance [] Double Vision [] Slurred Speech [] Trouble swallowing  [] Vertigo [] Tingling [] Numbness [] Weakness [] Loss of Balance   [] Loss of Consciousness [] Memory Loss [] Seizures  [x] Denies all unless marked  Psychiatric/Behavioral:[] Depression [] Anxiety  [x] Denies all unless marked  Sleep: []  Insomnia [] Sleep Disturbance [] Snoring [] Restless Legs [] Daytime Sleepiness [x] Sleep Apnea  [x] Denies all unless marked    The MA has completed the ROS with the patient. I have reviewed it in its' entirety with the patient and agree with the documentation. PHYSICAL EXAM  /68   Pulse 68   Resp 20   Ht 6' (1.829 m)   Wt 260 lb (117.9 kg)   BMI 35.26 kg/m²      Constitutional - No acute distress    HEENT- Conjunctiva normal.  No scars, masses, or lesions over external nose or ears, no neck masses noted, no jugular vein distension, no bruit  Cardiac- Regular rate and rhythm  Pulmonary- Clear to auscultation, good expansion, normal effort without use of accessory muscles  Musculoskeletal - No significant wasting of muscles noted, no bony deformities  Extremities - No clubbing, cyanosis or edema  Skin - Warm, dry, and intact.   No rash, erythema, or pallor  Psychiatric - Mood, affect, and behavior appear normal      Neurologic:  Extraocular movements are intact without nystagmus. Visual fields are full to confrontation. Facial movements are symmetrical and normal.  Speech is precise. Extremity strength is normal in both uppers and lowers. Deep tendon reflexes are intact and symmetrical.  Rapid alternating movements are unimpaired. Finger-to-nose testing is performed well, without dysmetria. Gait is normal.    I reviewed the following studies:      []  :  Clinical laboratory test results    []  :  Radiology reports    [x]  :  Review and summarization of medical records and/or obtain medical records     []  :  Previous/recent polysomnogram report(s)    [x]  :  [place] Sleepiness Scale: 6    [place] Sleepiness Scale    0 = would never doze  1 = slight chance of dozing  2 = moderate chance of dozing  3 = high chance of dozing    Situation Chance of Dozing (0-3)    Sitting and reading       3    Watching TV        0    Sitting, inactive in a public place (e.g. a theatre or a meeting) 0    As a passenger in a car for an hour without a break   0    Lying down to rest in the afternoon when circumstances permit 3    Sitting and talking to someone     0    Sitting quietly after a lunch without alcohol    0    In a car, while stopped for a few minutes in the traffic  0    Total         6      [x]  :  Compliance download: The BiPAP is set at a pressure of 20cm/15cm. Compliance download shows that he uses device: 100% of the time;  percentage of days with usage >=4 hours: 100%. AHI: 0.3 (Large leaks noted)-discussed    Assessment:       ICD-10-CM    1. Obstructive sleep apnea  G47.33 Sleep Study with PAP Titration     [facility]   2. Somnolence, daytime  R40.0 Sleep Study with PAP Titration     [facility]   3.  BiPAP (biphasic positive airway pressure) dependence  Z99.89 Sleep Study with PAP Titration     [facility]          []  :  Stable     []  :  Improved                       [x]  :  Well controlled-per compliance report but he persists to be drowsy; he does have frequent awakenings around 4:00am to 6:00am          []  :  Resolving     []  :  Resolved     []  :  Inadequately controlled     []  :  Worsening     []  :  Additional workup planned    Patient is compliant and benefiting from therapy as indicated by compliance evaluation. Plan:     No orders of the defined types were placed in this encounter. 1.   Patient advised of the etiology,  pathophysiology, diagnosis, treatment options, and risks of untreated UMM. Risks may include, but are not limited to  hypertension, coronary artery disease, diabetes, stroke, weight gain, impaired cognition, daytime somnolence,  and motor vehicle accidents. Advised to abstain from driving or operating heavy machinery when drowsy and the use of respiratory suppressants. 2.  The following educational material has been included in this visit after visit summary for your review: UMM/PAP guidelines/diabetic diet-Discussed with the patient and all questions fully answered. 3.  Continue BiPAP  4. Order-split-night PSG-to assess recurrent daytime somnolence  5. Follow up in 1 year          Note:  A total of >50% (>8 minutes) of 15 minutes was spent discussing the pathophysiology and treatment and/or coordination of care of the above diagnoses.

## 2021-03-29 ENCOUNTER — OFFICE VISIT (OUTPATIENT)
Dept: GASTROENTEROLOGY | Facility: CLINIC | Age: 56
End: 2021-03-29

## 2021-03-29 VITALS
OXYGEN SATURATION: 98 % | BODY MASS INDEX: 33.46 KG/M2 | DIASTOLIC BLOOD PRESSURE: 64 MMHG | HEIGHT: 72 IN | SYSTOLIC BLOOD PRESSURE: 108 MMHG | HEART RATE: 72 BPM | WEIGHT: 247 LBS | TEMPERATURE: 97.3 F

## 2021-03-29 DIAGNOSIS — Z86.010 HISTORY OF ADENOMATOUS POLYP OF COLON: Primary | ICD-10-CM

## 2021-03-29 DIAGNOSIS — Z83.71 FAMILY HISTORY OF POLYPS IN THE COLON: ICD-10-CM

## 2021-03-29 PROBLEM — Z83.719 FAMILY HISTORY OF POLYPS IN THE COLON: Status: ACTIVE | Noted: 2021-03-29

## 2021-03-29 PROCEDURE — S0285 CNSLT BEFORE SCREEN COLONOSC: HCPCS | Performed by: NURSE PRACTITIONER

## 2021-03-29 RX ORDER — ROSUVASTATIN CALCIUM 10 MG/1
20 TABLET, COATED ORAL DAILY
COMMUNITY

## 2021-03-29 RX ORDER — LITHIUM CARBONATE 600 MG/1
1 CAPSULE ORAL 2 TIMES DAILY WITH MEALS
COMMUNITY

## 2021-03-29 RX ORDER — CHOLECALCIFEROL (VITAMIN D3) 125 MCG
5 CAPSULE ORAL NIGHTLY PRN
COMMUNITY

## 2021-03-29 RX ORDER — SERTRALINE HYDROCHLORIDE 100 MG/1
1 TABLET, FILM COATED ORAL DAILY
COMMUNITY

## 2021-03-29 RX ORDER — CLONAZEPAM 2 MG/1
2 TABLET ORAL 2 TIMES DAILY
COMMUNITY
Start: 2021-03-18

## 2021-03-29 RX ORDER — PIOGLITAZONEHYDROCHLORIDE 30 MG/1
1 TABLET ORAL DAILY
COMMUNITY

## 2021-03-29 RX ORDER — CHLORAL HYDRATE 500 MG
2000 CAPSULE ORAL DAILY
COMMUNITY

## 2021-03-29 RX ORDER — FENOFIBRATE 145 MG/1
1 TABLET, COATED ORAL DAILY
COMMUNITY
Start: 2021-02-13

## 2021-03-29 RX ORDER — IRBESARTAN 75 MG/1
1 TABLET ORAL DAILY
COMMUNITY
Start: 2021-02-01

## 2021-03-29 RX ORDER — MULTIPLE VITAMINS W/ MINERALS TAB 9MG-400MCG
1 TAB ORAL DAILY
COMMUNITY

## 2021-03-29 RX ORDER — ERGOCALCIFEROL 1.25 MG/1
1 CAPSULE ORAL WEEKLY
COMMUNITY
Start: 2021-02-13

## 2021-03-29 RX ORDER — ZIPRASIDONE HYDROCHLORIDE 40 MG/1
1 CAPSULE ORAL EVERY 12 HOURS
COMMUNITY

## 2021-03-29 NOTE — PROGRESS NOTES
Chief Complaint   Patient presents with   • Colon Cancer Screening     Pt presents today for evaluation for colonoscopy-states his last colon was 11/2016 at [facility]-pt states every colonoscopy he has had he's had a couple of polyps-last colon had tubular adenomatous polyps     Subjective   HPI  Catalino Sheehan is a 55 y.o. male who presents as a referral for preventative maintenance. He has no complaints of nausea or vomiting. No change in bowels. No wt loss. No BRBPR. No melena. There is no family hx for colon cancer. No abdominal pain. There was no Cologuard screening this year.  The patients last colonoscopy with  11/9/16 with adenomatous polyps.    Past Medical History:   Diagnosis Date   • Alcohol abuse    • Anxiety    • Bipolar 1 disorder (CMS/HCC)    • Chronic kidney disease, stage 3 (CMS/HCC)    • Family history of colonic polyps    • History of adenomatous polyp of colon    • Hypercholesterolemia    • Hypertension    • Manic depression (CMS/HCC)    • ALISHA (obstructive sleep apnea)    • Type 2 diabetes mellitus (CMS/HCC)      Past Surgical History:   Procedure Laterality Date   • COLONOSCOPY  11/09/2016    Dr. Snyder-Diminuitive tubular adenomatous polyp was seen in the cecum; Diminuitive tubular adenomatous polyp in the descending colon; The patient had a very redundant colon; External hemorrhoids; Repeat 5 years   • EYE SURGERY  12/2018       Current Outpatient Medications:   •  clonazePAM (KlonoPIN) 2 MG tablet, Take 2 mg by mouth 2 (two) times a day., Disp: , Rfl:   •  fenofibrate (TRICOR) 145 MG tablet, Take 1 tablet by mouth Daily., Disp: , Rfl:   •  irbesartan (AVAPRO) 75 MG tablet, Take 1 tablet by mouth Daily., Disp: , Rfl:   •  lithium carbonate 300 MG capsule, Take 1 capsule by mouth Every 8 (Eight) Hours., Disp: , Rfl:   •  melatonin 5 MG tablet tablet, Take 5 mg by mouth At Night As Needed., Disp: , Rfl:   •  multivitamin with minerals (Vitamins/Minerals) tablet tablet, Take 1 tablet by  mouth Daily., Disp: , Rfl:   •  Omega-3 Fatty Acids (fish oil) 1000 MG capsule capsule, Take 2,000 mg by mouth Daily., Disp: , Rfl:   •  pioglitazone (Actos) 30 MG tablet, Take 1 tablet by mouth Daily., Disp: , Rfl:   •  rosuvastatin (CRESTOR) 10 MG tablet, Take 20 mg by mouth Daily., Disp: , Rfl:   •  sertraline (Zoloft) 100 MG tablet, Take 1 tablet by mouth Daily., Disp: , Rfl:   •  vitamin D (ERGOCALCIFEROL) 1.25 MG (50812 UT) capsule capsule, Take 1 capsule by mouth 1 (One) Time Per Week., Disp: , Rfl:   •  ziprasidone (Geodon) 40 MG capsule, Take 1 capsule by mouth Every 12 (Twelve) Hours., Disp: , Rfl:   •  Sodium Sulfate-Mag Sulfate-KCl 7256-290-962 MG tablet, Take 12 tablets by mouth Take As Directed., Disp: 24 tablet, Rfl: 0  Allergies   Allergen Reactions   • Bacitracin Rash   • Bacitracin-Neomycin-Polymyxin Rash     Social History     Socioeconomic History   • Marital status: Single     Spouse name: Not on file   • Number of children: Not on file   • Years of education: Not on file   • Highest education level: Not on file   Tobacco Use   • Smoking status: Never Smoker   • Smokeless tobacco: Current User     Types: Chew   Vaping Use   • Vaping Use: Never used   Substance and Sexual Activity   • Alcohol use: Not Currently     Family History   Problem Relation Age of Onset   • Colon polyps Mother         Unknown age    • Lung cancer Mother    • Colon cancer Neg Hx    • Esophageal cancer Neg Hx    • Liver cancer Neg Hx    • Liver disease Neg Hx    • Rectal cancer Neg Hx    • Stomach cancer Neg Hx        REVIEW OF SYSTEMS  General: well appearing, no fever chills or sweats, no unexplained wt loss  HEENT: no acute visual or hearing disturbances  Cardiovascular: No chest pain or palpitations  Pulmonary: No shortness of breath, coughing, wheezing or hemoptysis  : No burning, urgency, hematuria, or dysuria  Musculoskeletal: No joint pain or stiffness  Peripheral: no edema  Skin: No lesions or rashes  Neuro: No  "dizziness, headaches, stroke, syncope  Endocrine: No hot or cold intolerances  Hematological: No blood dyscrasias    Objective   Vitals:    03/29/21 1350   BP: 108/64   BP Location: Left arm   Patient Position: Sitting   Cuff Size: Adult   Pulse: 72   Temp: 97.3 °F (36.3 °C)   TempSrc: Infrared   SpO2: 98%   Weight: 112 kg (247 lb)   Height: 182.9 cm (72\")     Body mass index is 33.5 kg/m².    PHYSICAL EXAM  General: age appropriate well nourished well appearing, no acute distress  Head: normocephalic and atraumatic  Global assessment-supple  Neck-No JVD noted, no lymphadenopathy  Pulmonary-clear to auscultation bilaterally, normal respiratory effort  Cardiovascular-normal rate and rhythm, normal heart sounds, S1 and S2 noted  Abdomen-soft, non tender, non distended, normal bowel sounds all 4 quadrants, no hepatosplenomegaly noted  Extremities-No clubbing cyanosis or edema  Neuro-Non focal, converses appropriately, awake, alert, oriented      Imaging Results (Most Recent)     None        Assessment/Plan   Diagnoses and all orders for this visit:    1. History of adenomatous polyp of colon (Primary)  -     Case Request; Standing  -     Case Request    2. Family history of polyps in the colon  -     Case Request; Standing  -     Case Request    Other orders  -     Follow Anesthesia Guidelines / Protocol; Future  -     Obtain Informed Consent; Future  -     Implement Anesthesia Orders Day of Procedure; Standing  -     Obtain Informed Consent; Standing  -     Verify bowel prep was successful; Standing  -     Sodium Sulfate-Mag Sulfate-KCl 8418-765-508 MG tablet; Take 12 tablets by mouth Take As Directed.  Dispense: 24 tablet; Refill: 0      COLONOSCOPY WITH ANESTHESIA (N/A)       Body mass index is 33.5 kg/m². Patient's Body mass index is 33.5 kg/m². BMI is above normal parameters. Recommendations include: nutrition counseling.      All risks, benefits, alternatives, and indications of colonoscopy procedure have been " discussed with the patient. Risks to include perforation of the colon requiring possible surgery or colostomy, risk of bleeding from biopsies or removal of colon tissue, possibility of missing a colon polyp or cancer, or adverse drug reaction.  Benefits to include the diagnosis and management of disease of the colon and rectum. Alternatives to include barium enema, radiographic evaluation, lab testing or no intervention. Pt verbalizes understanding and agrees.

## 2021-04-19 ENCOUNTER — TRANSCRIBE ORDERS (OUTPATIENT)
Dept: GASTROENTEROLOGY | Facility: CLINIC | Age: 56
End: 2021-04-19

## 2021-04-19 ENCOUNTER — LAB (OUTPATIENT)
Dept: LAB | Facility: HOSPITAL | Age: 56
End: 2021-04-19

## 2021-04-19 DIAGNOSIS — Z01.818 PREOPERATIVE TESTING: Primary | ICD-10-CM

## 2021-04-19 LAB — SARS-COV-2 ORF1AB RESP QL NAA+PROBE: NOT DETECTED

## 2021-04-19 PROCEDURE — C9803 HOPD COVID-19 SPEC COLLECT: HCPCS | Performed by: INTERNAL MEDICINE

## 2021-04-19 PROCEDURE — U0004 COV-19 TEST NON-CDC HGH THRU: HCPCS | Performed by: INTERNAL MEDICINE

## 2021-04-22 ENCOUNTER — ANESTHESIA EVENT (OUTPATIENT)
Dept: GASTROENTEROLOGY | Facility: HOSPITAL | Age: 56
End: 2021-04-22

## 2021-04-22 ENCOUNTER — HOSPITAL ENCOUNTER (OUTPATIENT)
Facility: HOSPITAL | Age: 56
Setting detail: HOSPITAL OUTPATIENT SURGERY
Discharge: HOME | End: 2021-04-22
Attending: INTERNAL MEDICINE | Admitting: INTERNAL MEDICINE

## 2021-04-22 ENCOUNTER — TELEPHONE (OUTPATIENT)
Dept: GASTROENTEROLOGY | Facility: CLINIC | Age: 56
End: 2021-04-22

## 2021-04-22 ENCOUNTER — ANESTHESIA (OUTPATIENT)
Dept: GASTROENTEROLOGY | Facility: HOSPITAL | Age: 56
End: 2021-04-22

## 2021-04-22 VITALS
TEMPERATURE: 97.1 F | OXYGEN SATURATION: 97 % | HEIGHT: 72 IN | SYSTOLIC BLOOD PRESSURE: 118 MMHG | HEART RATE: 79 BPM | DIASTOLIC BLOOD PRESSURE: 79 MMHG | RESPIRATION RATE: 19 BRPM | BODY MASS INDEX: 34.4 KG/M2 | WEIGHT: 254 LBS

## 2021-04-22 LAB — GLUCOSE BLDC GLUCOMTR-MCNC: 92 MG/DL (ref 70–130)

## 2021-04-22 PROCEDURE — 82962 GLUCOSE BLOOD TEST: CPT

## 2021-04-22 PROCEDURE — 45378 DIAGNOSTIC COLONOSCOPY: CPT | Performed by: INTERNAL MEDICINE

## 2021-04-22 PROCEDURE — 25010000002 PROPOFOL 10 MG/ML EMULSION: Performed by: NURSE ANESTHETIST, CERTIFIED REGISTERED

## 2021-04-22 RX ORDER — LIDOCAINE HYDROCHLORIDE 10 MG/ML
0.5 INJECTION, SOLUTION EPIDURAL; INFILTRATION; INTRACAUDAL; PERINEURAL ONCE AS NEEDED
Status: CANCELLED | OUTPATIENT
Start: 2021-04-22

## 2021-04-22 RX ORDER — SODIUM CHLORIDE 0.9 % (FLUSH) 0.9 %
10 SYRINGE (ML) INJECTION AS NEEDED
Status: DISCONTINUED | OUTPATIENT
Start: 2021-04-22 | End: 2021-04-22 | Stop reason: HOSPADM

## 2021-04-22 RX ORDER — LIDOCAINE HYDROCHLORIDE 20 MG/ML
INJECTION, SOLUTION EPIDURAL; INFILTRATION; INTRACAUDAL; PERINEURAL AS NEEDED
Status: DISCONTINUED | OUTPATIENT
Start: 2021-04-22 | End: 2021-04-22 | Stop reason: SURG

## 2021-04-22 RX ORDER — SODIUM CHLORIDE 9 MG/ML
500 INJECTION, SOLUTION INTRAVENOUS CONTINUOUS PRN
Status: DISCONTINUED | OUTPATIENT
Start: 2021-04-22 | End: 2021-04-22 | Stop reason: HOSPADM

## 2021-04-22 RX ORDER — PROPOFOL 10 MG/ML
VIAL (ML) INTRAVENOUS AS NEEDED
Status: DISCONTINUED | OUTPATIENT
Start: 2021-04-22 | End: 2021-04-22 | Stop reason: SURG

## 2021-04-22 RX ADMIN — PROPOFOL 400 MG: 10 INJECTION, EMULSION INTRAVENOUS at 08:52

## 2021-04-22 RX ADMIN — SODIUM CHLORIDE 500 ML: 9 INJECTION, SOLUTION INTRAVENOUS at 08:01

## 2021-04-22 RX ADMIN — LIDOCAINE HYDROCHLORIDE 100 MG: 20 INJECTION, SOLUTION EPIDURAL; INFILTRATION; INTRACAUDAL; PERINEURAL at 08:52

## 2021-04-22 NOTE — ANESTHESIA POSTPROCEDURE EVALUATION
Patient: Catalino Sheehan    Procedure Summary     Date: 04/22/21 Room / Location: [facility] ENDOSCOPY 5 / [facility] ENDOSCOPY    Anesthesia Start: 0848 Anesthesia Stop: 0917    Procedure: COLONOSCOPY WITH ANESTHESIA (N/A ) Diagnosis:       History of adenomatous polyp of colon      Family history of polyps in the colon      (History of adenomatous polyp of colon [Z86.010])      (Family history of polyps in the colon [Z83.71])    Surgeons: Albertina Gillette MD Provider: Kanika Blackwood CRNA    Anesthesia Type: MAC ASA Status: 2          Anesthesia Type: MAC    Vitals  Vitals Value Taken Time   /79 04/22/21 0930   Temp     Pulse 75 04/22/21 0933   Resp 19 04/22/21 0930   SpO2 99 % 04/22/21 0933   Vitals shown include unvalidated device data.        Post Anesthesia Care and Evaluation    Patient location during evaluation: PHASE II  Patient participation: complete - patient participated  Level of consciousness: awake and alert  Pain management: adequate  Airway patency: patent  Anesthetic complications: No anesthetic complications  PONV Status: none  Cardiovascular status: acceptable  Respiratory status: acceptable  Hydration status: acceptable  No anesthesia care post op

## 2021-04-22 NOTE — ANESTHESIA PREPROCEDURE EVALUATION
Anesthesia Evaluation     Patient summary reviewed   no history of anesthetic complications:  NPO Solid Status: > 8 hours             Airway   Mallampati: II  Dental      Pulmonary    (+) sleep apnea on CPAP,   Cardiovascular   Exercise tolerance: excellent (>7 METS)    (+) hypertension, hyperlipidemia,       Neuro/Psych- negative ROS  GI/Hepatic/Renal/Endo    (+) obesity,   renal disease CRI, diabetes mellitus,     Musculoskeletal     Abdominal    Substance History      OB/GYN          Other                        Anesthesia Plan    ASA 2     MAC       Anesthetic plan, all risks, benefits, and alternatives have been provided, discussed and informed consent has been obtained with: patient.

## 2021-05-22 ENCOUNTER — HOSPITAL ENCOUNTER (EMERGENCY)
Facility: HOSPITAL | Age: 56
Discharge: HOME | End: 2021-05-22
Admitting: EMERGENCY MEDICINE

## 2021-05-22 VITALS
SYSTOLIC BLOOD PRESSURE: 133 MMHG | HEIGHT: 72 IN | OXYGEN SATURATION: 95 % | TEMPERATURE: 97.8 F | BODY MASS INDEX: 34.95 KG/M2 | RESPIRATION RATE: 20 BRPM | DIASTOLIC BLOOD PRESSURE: 86 MMHG | HEART RATE: 85 BPM | WEIGHT: 258 LBS

## 2021-05-22 DIAGNOSIS — G47.00 INSOMNIA, UNSPECIFIED TYPE: Primary | ICD-10-CM

## 2021-05-22 PROCEDURE — 99282 EMERGENCY DEPT VISIT SF MDM: CPT

## 2021-05-22 NOTE — ED PROVIDER NOTES
Subjective   Patient is a 55-year-old male with a 15-year history of anxiety presents to the ER today requesting sedatives.  The patient reports that he has been taking clonazepam for some time for anxiety.  He states that he has panic attacks on most days.  He states that for the past 1 year he has had insomnia and does not sleep well at night.  He states that he has been set up to have a sleep study performed.  Patient reports that he followed up with his primary care provider as of his problems I would like to have some sedatives to help him to sleep at night.  He denies any chest pain shortness of breath or other symptoms.  He states that this is the same symptoms he has had for a long time with his anxiety.  He denies any thoughts of suicide or homicide.  He states he was just hoping to get some sedatives today.  He presents here today for further evaluation.      History provided by:  Patient   used: No    Other  Location:  Requesting Rx for sedatives to help sleep  Severity:  Moderate  Onset quality:  Sudden  Timing:  Constant  Progression:  Unchanged  Chronicity:  Chronic  Associated symptoms: no abdominal pain, no chest pain, no congestion, no cough, no diarrhea, no ear pain, no fatigue, no fever, no headaches, no loss of consciousness, no myalgias, no nausea, no rash, no rhinorrhea, no shortness of breath, no sore throat, no vomiting and no wheezing        Review of Systems   Constitutional: Negative for fatigue and fever.   HENT: Negative for congestion, ear pain, rhinorrhea and sore throat.    Respiratory: Negative for cough, shortness of breath and wheezing.    Cardiovascular: Negative for chest pain.   Gastrointestinal: Negative for abdominal pain, diarrhea, nausea and vomiting.   Musculoskeletal: Negative for myalgias.   Skin: Negative for rash.   Neurological: Negative for loss of consciousness and headaches.   All other systems reviewed and are negative.      Past Medical  History:   Diagnosis Date   • Alcohol abuse    • Anxiety    • Bipolar 1 disorder (CMS/HCC)    • Chronic kidney disease, stage 3 (CMS/HCC)    • Family history of colonic polyps    • History of adenomatous polyp of colon    • Hypercholesterolemia    • Hypertension    • Manic depression (CMS/HCC)    • ALISHA (obstructive sleep apnea)    • Type 2 diabetes mellitus (CMS/HCC)        Allergies   Allergen Reactions   • Bacitracin Rash   • Bacitracin-Neomycin-Polymyxin Rash       Past Surgical History:   Procedure Laterality Date   • COLONOSCOPY  11/09/2016    Dr. Snyder-Diminuitive tubular adenomatous polyp was seen in the cecum; Diminuitive tubular adenomatous polyp in the descending colon; The patient had a very redundant colon; External hemorrhoids; Repeat 5 years   • COLONOSCOPY N/A 4/22/2021    Procedure: COLONOSCOPY WITH ANESTHESIA;  Surgeon: Albertina Gilltete MD;  Location: [facility] ENDOSCOPY;  Service: Gastroenterology;  Laterality: N/A;  pre op: hx polyps  post op:normal  PCP: David Tatum MD   • EYE SURGERY  12/2018       Family History   Problem Relation Age of Onset   • Colon polyps Mother         Unknown age    • Lung cancer Mother    • Lung cancer Father    • Colon cancer Neg Hx    • Esophageal cancer Neg Hx    • Liver cancer Neg Hx    • Liver disease Neg Hx    • Rectal cancer Neg Hx    • Stomach cancer Neg Hx        Social History     Socioeconomic History   • Marital status: Single     Spouse name: Not on file   • Number of children: Not on file   • Years of education: Not on file   • Highest education level: Not on file   Tobacco Use   • Smoking status: Never Smoker   • Smokeless tobacco: Current User     Types: Chew   Vaping Use   • Vaping Use: Never used   Substance and Sexual Activity   • Alcohol use: Not Currently   • Drug use: Never   • Sexual activity: Defer           Objective   Physical Exam  Vitals and nursing note reviewed.   Constitutional:       Appearance: Normal appearance.   HENT:      Head:  Normocephalic and atraumatic.   Eyes:      Conjunctiva/sclera: Conjunctivae normal.   Cardiovascular:      Rate and Rhythm: Normal rate and regular rhythm.   Pulmonary:      Effort: Pulmonary effort is normal.      Breath sounds: Normal breath sounds.   Skin:     General: Skin is warm and dry.      Capillary Refill: Capillary refill takes less than 2 seconds.   Neurological:      General: No focal deficit present.      Mental Status: He is alert.   Psychiatric:      Comments: Appears anxious         Procedures           ED Course  ED Course as of May 22 1434   Sat May 22, 2021   1433 Pt advised that he will need to f/u with his PCP to discuss RX for sedatives; I am not trouble prescribing this for the patient at this time.  I did offer the patient a lab work-up to see if there is any other causes of his anxiety but he declines and states that he just wanted the prescription that he will follow-up his primary care provider.  He will be discharged at this time in stable condition.    [LF]      ED Course User Index  [LF] Raegan Slade, KAELYN                                   No orders to display     Labs Reviewed - No data to display          MDM  Number of Diagnoses or Management Options  Insomnia, unspecified type: new and does not require workup  Patient Progress  Patient progress: stable      Final diagnoses:   Insomnia, unspecified type       ED Disposition  ED Disposition     ED Disposition Condition Comment    Discharge Stable           David Tatum MD  [address] [facility] [address] [phone]    Call in 1 day           Medication List      No changes were made to your prescriptions during this visit.          Raegan Slade APRN  05/22/21 1437

## 2021-05-24 ENCOUNTER — HOSPITAL ENCOUNTER (EMERGENCY)
Age: 56
Discharge: HOME | End: 2021-05-24
Attending: EMERGENCY MEDICINE
Payer: COMMERCIAL

## 2021-05-24 ENCOUNTER — TELEPHONE (OUTPATIENT)
Dept: PSYCHIATRY | Age: 56
End: 2021-05-24

## 2021-05-24 VITALS
TEMPERATURE: 97.6 F | SYSTOLIC BLOOD PRESSURE: 128 MMHG | RESPIRATION RATE: 17 BRPM | DIASTOLIC BLOOD PRESSURE: 74 MMHG | BODY MASS INDEX: 35.21 KG/M2 | WEIGHT: 260 LBS | HEART RATE: 64 BPM | OXYGEN SATURATION: 97 % | HEIGHT: 72 IN

## 2021-05-24 DIAGNOSIS — F41.9 ANXIETY: Primary | ICD-10-CM

## 2021-05-24 DIAGNOSIS — F41.0 PANIC ATTACKS: ICD-10-CM

## 2021-05-24 LAB
ACETAMINOPHEN LEVEL: <15 UG/ML
ALBUMIN SERPL-MCNC: 5 G/DL (ref 3.5–5.2)
ALP BLD-CCNC: 35 U/L (ref 40–130)
ALT SERPL-CCNC: 24 U/L (ref 5–41)
AMPHETAMINE SCREEN, URINE: NEGATIVE
ANION GAP SERPL CALCULATED.3IONS-SCNC: 7 MMOL/L (ref 7–19)
AST SERPL-CCNC: 22 U/L (ref 5–40)
BARBITURATE SCREEN URINE: NEGATIVE
BASOPHILS ABSOLUTE: 0.1 K/UL (ref 0–0.2)
BASOPHILS RELATIVE PERCENT: 1 % (ref 0–1)
BENZODIAZEPINE SCREEN, URINE: POSITIVE
BILIRUB SERPL-MCNC: 0.5 MG/DL (ref 0.2–1.2)
BUN BLDV-MCNC: 20 MG/DL (ref 6–20)
CALCIUM SERPL-MCNC: 10.7 MG/DL (ref 8.6–10)
CANNABINOID SCREEN URINE: NEGATIVE
CHLORIDE BLD-SCNC: 101 MMOL/L (ref 98–111)
CO2: 28 MMOL/L (ref 22–29)
COCAINE METABOLITE SCREEN URINE: NEGATIVE
CREAT SERPL-MCNC: 1.6 MG/DL (ref 0.5–1.2)
EOSINOPHILS ABSOLUTE: 0.1 K/UL (ref 0–0.6)
EOSINOPHILS RELATIVE PERCENT: 1 % (ref 0–5)
ETHANOL: <10 MG/DL (ref 0–0.08)
GFR AFRICAN AMERICAN: 54
GFR NON-AFRICAN AMERICAN: 45
GLUCOSE BLD-MCNC: 129 MG/DL (ref 74–109)
HCT VFR BLD CALC: 44.9 % (ref 42–52)
HEMOGLOBIN: 15.4 G/DL (ref 14–18)
IMMATURE GRANULOCYTES #: 0 K/UL
LYMPHOCYTES ABSOLUTE: 1.2 K/UL (ref 1.1–4.5)
LYMPHOCYTES RELATIVE PERCENT: 25.3 % (ref 20–40)
Lab: ABNORMAL
MCH RBC QN AUTO: 31.8 PG (ref 27–31)
MCHC RBC AUTO-ENTMCNC: 34.3 G/DL (ref 33–37)
MCV RBC AUTO: 92.8 FL (ref 80–94)
MONOCYTES ABSOLUTE: 0.4 K/UL (ref 0–0.9)
MONOCYTES RELATIVE PERCENT: 9.1 % (ref 0–10)
NEUTROPHILS ABSOLUTE: 3 K/UL (ref 1.5–7.5)
NEUTROPHILS RELATIVE PERCENT: 63 % (ref 50–65)
OPIATE SCREEN URINE: NEGATIVE
PDW BLD-RTO: 13.6 % (ref 11.5–14.5)
PLATELET # BLD: 236 K/UL (ref 130–400)
PMV BLD AUTO: 9.7 FL (ref 9.4–12.4)
POTASSIUM SERPL-SCNC: 4.2 MMOL/L (ref 3.5–5)
RBC # BLD: 4.84 M/UL (ref 4.7–6.1)
SALICYLATE, SERUM: <3 MG/DL (ref 3–10)
SODIUM BLD-SCNC: 136 MMOL/L (ref 136–145)
TOTAL PROTEIN: 7.7 G/DL (ref 6.6–8.7)
TSH REFLEX FT4: 2.59 UIU/ML (ref 0.35–5.5)
WBC # BLD: 4.8 K/UL (ref 4.8–10.8)

## 2021-05-24 PROCEDURE — 36415 COLL VENOUS BLD VENIPUNCTURE: CPT

## 2021-05-24 PROCEDURE — 85025 COMPLETE CBC W/AUTO DIFF WBC: CPT

## 2021-05-24 PROCEDURE — 80179 DRUG ASSAY SALICYLATE: CPT

## 2021-05-24 PROCEDURE — 80143 DRUG ASSAY ACETAMINOPHEN: CPT

## 2021-05-24 PROCEDURE — 99282 EMERGENCY DEPT VISIT SF MDM: CPT

## 2021-05-24 PROCEDURE — 80307 DRUG TEST PRSMV CHEM ANLYZR: CPT

## 2021-05-24 PROCEDURE — 82077 ASSAY SPEC XCP UR&BREATH IA: CPT

## 2021-05-24 PROCEDURE — 84443 ASSAY THYROID STIM HORMONE: CPT

## 2021-05-24 PROCEDURE — 80053 COMPREHEN METABOLIC PANEL: CPT

## 2021-05-24 ASSESSMENT — ENCOUNTER SYMPTOMS
VOMITING: 0
COUGH: 0
ABDOMINAL PAIN: 0
SHORTNESS OF BREATH: 0

## 2021-05-24 NOTE — SUICIDE SAFETY PLAN
SAFETY PLAN    A suicide Safety Plan is a document that supports someone when they are having thoughts of suicide. Warning Signs that indicate a suicidal crisis may be developing: What (situations, thoughts, feelings, body sensations, behaviors, etc.) do you experience that lets you know you are beginning to think about suicide? 1. Chest tightness  2. Increased anxiety  3. Internal Coping Strategies:  What things can I do (relaxation techniques, hobbies, physical activities, etc.) to take my mind off my problems without contacting another person? 1. Watching television  2. Working in the yard  3. [employer] and social settings that provide distraction: Who can I call or where can I go to distract me? 1. Name: Gisselle Gaston  Phone: [phone]  2. Name: Dr. Federico Bragg  Phone:    3. Place:             4. Place:     People whom I can ask for help: Who can I call when I need help - for example, friends, family, clergy, someone else? 1. Name: Gisselle Gaston                Phone: [phone]  2. Name: Dr. Federico Bragg  Phone:   3. Name:   Phone:     Professionals or [address] [place], [place] [address]vd I can contact during a crisis: Who can I call for help - for example, my doctor, my psychiatrist, my psychologist, a mental health provider, a suicide hotline? 1. Clinician Name: [id number] [address]igan   Phone: [phone]      Clinician Pager or Emergency Contact #: [phone]    2. Clinician Name: [address]   Phone: [phone]      Clinician Pager or Emergency Contact #: [phone]    3. Suicide Prevention Lifeline: 9-296-136-TALK (7182)    4. [facility] : [address] [facility] Emergency Department      Emergency Services Address: [place] [address] [address] [facility]  Emergency Services Phone: [phone]    Making the environment safe: How can I make my environment (house/apartment/living space) safer?  For example, can I remove guns, medications, and other

## 2021-05-24 NOTE — TELEPHONE ENCOUNTER
Gertrude from intake called from the ED to schedule an appt for this pt per Dr. Carlos Miss request.  Pt is not getting admitted but wanted to make a therapy appt for him. Pt was made aware to call our office if he decided to go somewhere else. Pt was schedule for our first avaiable therapy appt which was 6/22.     Electronically signed by Raymon Chanel MA on 5/24/2021 at 9:43 AM

## 2021-05-24 NOTE — ED PROVIDER NOTES
unspecified type diabetes mellitus without mention of complication, not stated as uncontrolled     Unspecified sleep apnea          SURGICAL HISTORY       Past Surgical History:   Procedure Laterality Date    BELPHAROPTOSIS REPAIR      x2    COLONOSCOPY  8-    Dr Henrry Chambers 11/9/2016    Dr Paige-external hemorrhoids, tubular AP (-) dysplasia x 2--5 yr recall         CURRENT MEDICATIONS       Previous Medications    ASCORBIC ACID (VITAMIN C) 500 MG TABLET    Take 1,000 mg by mouth daily     CLONAZEPAM (KLONOPIN) 2 MG TABLET    TAKE 1 TABLET BY MOUTH 3 TIMES A DAY FOR 30 DAYS    FISH OIL    2,000 mg by Does not apply route 2 times daily     IRBESARTAN (AVAPRO) 75 MG TABLET        LITHIUM 300 MG CAPSULE    TAKE 1 CAPSULE BY MOUTH THREE TIMES A DAY    MULTIPLE VITAMINS-MINERALS (THERAPEUTIC MULTIVITAMIN-MINERALS) TABLET    Take 1 tablet by mouth daily    PIOGLITAZONE (ACTOS) 30 MG TABLET    Take 1 tablet by mouth daily    RANITIDINE HCL (ZANTAC PO)    Take by mouth    ROSUVASTATIN (CRESTOR) 10 MG TABLET    Take 20 mg by mouth daily     SERTRALINE (ZOLOFT) 100 MG TABLET    TAKE 1 TABLET ONCE A DAY    ZIPRASIDONE (GEODON) 40 MG CAPSULE    TAKE ONE CAPSULE BY MOUTH TWICE A DAY WITH FOOD       ALLERGIES     Neosporin [bacitracin-neomycin-polymyxin]    FAMILY HISTORY       Family History   Problem Relation Age of Onset    Colon Polyps Mother     Colon Cancer Neg Hx     Esophageal Cancer Neg Hx     Liver Cancer Neg Hx     Liver Disease Neg Hx     Rectal Cancer Neg Hx     Stomach Cancer Neg Hx           SOCIAL HISTORY       Social History     Socioeconomic History    Marital status: Single     Spouse name: None    Number of children: None    Years of education: None    Highest education level: None   Occupational History    None   Tobacco Use    Smoking status: Never Smoker    Smokeless tobacco: Current User     Types: Snuff   Substance and Sexual Activity    Alcohol use: No    Capillary Refill: Capillary refill takes less than 2 seconds. Neurological:      General: No focal deficit present. Mental Status: He is alert and oriented to person, place, and time. GCS: GCS eye subscore is 4. GCS verbal subscore is 5. GCS motor subscore is 6. Sensory: No sensory deficit. Motor: No weakness. Coordination: Coordination is intact. Gait: Gait normal.   Psychiatric:         Attention and Perception: Attention and perception normal.         Mood and Affect: Mood is anxious. Speech: Speech normal.         Behavior: Behavior normal. Behavior is cooperative. Thought Content: Thought content normal. Thought content is not paranoid or delusional. Thought content does not include homicidal or suicidal ideation. Thought content does not include homicidal or suicidal plan.          DIAGNOSTIC RESULTS     EKG: All EKG's are interpreted by the Emergency Department Physician who either signs or Co-signs this chart in the absence of a cardiologist.        RADIOLOGY:   Non-plain film images such as CT, Ultrasound and MRI are read by the radiologist. [facility] [place] [place] images are visualized and preliminarily interpreted by the emergency physician with the below findings:        Interpretation per the Radiologist below, if available at the time of this note:    No orders to display         ED BEDSIDE ULTRASOUND:   Performed by ED Physician - none    LABS:  Labs Reviewed   COMPREHENSIVE METABOLIC PANEL - Abnormal; Notable for the following components:       Result Value    Glucose 129 (*)     CREATININE 1.6 (*)     GFR Non- 45 (*)     GFR  54 (*)     Calcium 10.7 (*)     Alkaline Phosphatase 35 (*)     All other components within normal limits   CBC WITH AUTO DIFFERENTIAL - Abnormal; Notable for the following components:    MCH 31.8 (*)     All other components within normal limits   URINE DRUG SCREEN - Abnormal; Notable for the following No medications on file          (Please note that portions of this note were completed with a voice recognition program.  Efforts were made to edit the dictations butoccasionally words are mis-transcribed.)    Tiffany Yu MD (electronically signed)  AttendingEmergency Physician         Tiffany Yu MD  05/24/21 1659

## 2021-05-24 NOTE — PROGRESS NOTES
[facility] ADULT INITIAL INTAKE ASSESSMENT     5/24/21    Leora Lang ,a 60 y.o. male, presents to the ED for a psychiatric assessment. ED Arrival time: [address]  ED physician: SENA ELAM Notification time: 46  [facility] Assessment start time: 0900  Psychiatrist call time: ***  Spoke with Dr. Mahamed Dooley    Patient is referred by: Self    Reason for visit to ED - Presenting problem:     PT states reason for ED visit, \"***    ED Provider reports: Leora Lang is a 60 y.o. male who presents to the emergency department with panic attacks x 15 years.      Got started on valium 10 mg tid on FRI. Saw Dr. Delilah Garcia.     Increase in symptoms over last days.     Slept  1 hr last nigth cant sleep due to anxiety. denies si hi avh    Duration of symptoms: one month    Current Stressors: anxiety about everything    C-SSRS Completed: yes    SI:  denies   Plan: no   Past SI attempts: no   If yes, when and how many times:  Describe suicide attempts:   HI: denies  If yes describe:   Delusions: denies  If yes describe:   Hallucinations: denies   If yes describe:   Risk of Harm to self: Self injurious/self mutilation behaviorsno   If yes explain:   Was it within the past 6 months: no   Risk of Harm to others: no   If yes explain:   Was it within the past 6 months: no   Trauma History:   Anxiety 1-10:  3  Explain if increased:   Depression 1-10:  0  Explain if increased:   Level of function outside hospital decreased: yes   If yes explain: not sleeping      Psychiatric Hospitalizations: No   Where & When: n/a  Outpatient Psychiatric Treatment:    Family History:    Family history of mental illness: Yes uncle schizophrenic   \"Depression\",\"Anxiety\",\"Bipolar\",\"Schizophrenia\",\"Borderline\",\"ADHD\"}  Family members with suicide attempt: yes   If yes explain (attempted or completed): Uncle shot self    Substance Abuse History:     SBIRT Completed: yes  Brief Intervention completed if needed:  (Yes/No)    Current ETOH LEVELS: < 10    ETOH Usage: Amount drinking daily: denied    Date of last drink:   Longest period of sobriety:    Substance/Chemical Abuse/Recreational Drug History:  Substance used: alcohol  Date of last substance use: 15 years ago  Tobacco Use: yes smokeless tobacco   History of rehab treatment: alcohol rehab  How many times in rehab: once  Last time in rehab: 15 years ago  Family history of substance abuse: father and brother alcoholism    Opiates: It was discussed with pt they would not be receiving opiates unless they were within 3 days post surgery/acute injury. Patient voiced understanding and agreed. Psychiatric Review Of Systems:     Recent Sleep changes: yes   Recent appetite changes: yes   Recent weight changes/Pounds gained (+) or lost (-): no      Medical History:     Medical Diagnosis/Issues: Hyperlipidemia, Diabetes, Chronic Kidney Disease  CT today in ED:no  Use of 02 or CPAP: yes CPAP at night  Ambulatory: yes  Independent or Need assistance with Self Care:     PCP: Karen Stallworth MD     Current Medications:   Scheduled Meds: No current facility-administered medications for this encounter.     Current Outpatient Medications:     irbesartan (AVAPRO) 75 MG tablet, , Disp: , Rfl:     RaNITidine HCl (ZANTAC PO), Take by mouth, Disp: , Rfl:     pioglitazone (ACTOS) 30 MG tablet, Take 1 tablet by mouth daily, Disp: , Rfl: 0    rosuvastatin (CRESTOR) 10 MG tablet, Take 20 mg by mouth daily , Disp: , Rfl:     Multiple Vitamins-Minerals (THERAPEUTIC MULTIVITAMIN-MINERALS) tablet, Take 1 tablet by mouth daily, Disp: , Rfl:     sertraline (ZOLOFT) 100 MG tablet, TAKE 1 TABLET ONCE A DAY, Disp: , Rfl: 3    clonazePAM (KLONOPIN) 2 MG tablet, TAKE 1 TABLET BY MOUTH 3 TIMES A DAY FOR 30 DAYS, Disp: , Rfl: 2    lithium 300 MG capsule, TAKE 1 CAPSULE BY MOUTH THREE TIMES A DAY, Disp: , Rfl: 3    ziprasidone (GEODON) 40 MG capsule, TAKE ONE CAPSULE BY MOUTH TWICE A DAY WITH FOOD, Disp: , Rfl: 3    FISH OIL, 2,000 mg by Does not apply route 2 times daily , Disp: , Rfl:     Ascorbic Acid (VITAMIN C) 500 MG tablet, Take 1,000 mg by mouth daily , Disp: , Rfl:      Mental Status Evaluation:     Appearance:  age appropriate   Behavior:  Restless & fidgety   Speech:  normal pitch and normal volume   Mood:  anxious   Affect:  mood-congruent   Thought Process:  circumstantial   Thought Content:  Denies SI, HI, AVH, not delusional or psychotic   Sensorium:  person, place, time/date and situation   Cognition:  grossly intact   Insight:  age appropriate       Collateral Information:     Name: ***  Relationship: ***  Phone Number: ***  Collateral: ***    Current living arrangement: Lives alone  Current Support System: self  Employment: Retired    Disposition:     Choose one of the options below for disposition:     1. Decision to admit to :{no/yes:069744::\"no\"}    If yes, which unit Adult or Geriatric Unit:  {Blank single:60295::\"Adult\",\"Geriatric\"}  Is patient voluntary: {no/yes:225532::\"yes\"}  If no has a 72 hold been initiated:   Admission Diagnosis: ***    Does the patient have a guardian or Medical POA:   Has the guardian been notified or Medical POA:       2.  Decision to Discharge:   Does not meet criteria for acceptance to   unit due to: ***    3. Transferred:       Patient was transferred due to: ***    Other follow up information provided:      Jn Cabrales RN

## 2021-05-26 ENCOUNTER — HOSPITAL ENCOUNTER (OUTPATIENT)
Dept: SLEEP CENTER | Age: 56
Discharge: HOME | End: 2021-05-28
Payer: COMMERCIAL

## 2021-05-26 PROCEDURE — 95810 POLYSOM 6/> YRS 4/> PARAM: CPT

## 2021-05-27 NOTE — PROGRESS NOTES
[facility]  [address], [address]  Phone [phone] Fax [phone]     Sleep Study Technician Review    Patient Name:  Krystal Humphries  :   1965  Referring Provider: Anderson Sever, PA    Brief History:   Krystal Humphries is a 60 y.o. male  with a history of severe UMM  who comes in for an annual sleep clinic follow up. In review, he was initially diagnosed with UMM in  and was prescribed CPAP and was intolerant to the pressure and stopped using it. He had persistent symptoms of UMM and decided to be re-evaluated. He was later referred for an HST. The HST, 2017 revealed an AHI of 73.3. He was prescribed auto CPAP but he continued to use old device, which is a BiPAP according to the download. He thinks that the BiPAP is greater than 8years old. The BiPAP is set at a pressure of 20cm/15cm. The compliance report indicates that he is averaging 10.5  hours of BiPAP use per day. He sleeps 9 hours per night. He uses nasal mask and he opened his mouth. He has started using a chin strap. He has frequent awakenings from 4:00 to  6:00. He c/o excessive daytime somnolence. His sleep is non-restorative. The EDS starts at 8:00 am. He falls asleep at his computer at work. He does not fall asleep while driving. By mid-day the daytime somnolence improves until the evening. The RLS is stable with clonazepam. It sometimes recurs but usually it is stable. The current BiPAP is outdated.        Height: 72 inches  Weight: 260 lbs  BMI: 35.26   Neck Circ:  18  MALLAMPATI:4  ESS:6      Type of Study: PSG  Time Stage Position Snore Hypopnea Obs Apnea Shantell Apnea PAP O2   2200 awake supine no no no no  RA   2300 awake Left side no no no no  RA   2400 awake Left side no no no no  RA   0100 2 Left side no no no no  RA   0200 2 Left side no no no no  RA   0300 awake Left side no no no no  RA   0400 1 Left side no no no no  RA     Summary: Patient did not have enough sleep time to do a split night study in a timely manner. He had a hard  Getting to sleep. DME: Jose Magaña     The study was reviewed briefly with Leora Lang. He will be notified of the formal results and recommendations after the study is scored and interpreted. The report will be sent to his referring provider.     Technician: Naty Fam RRT, RPSGT

## 2021-05-28 ENCOUNTER — TELEPHONE (OUTPATIENT)
Dept: PSYCHIATRY | Age: 56
End: 2021-05-28

## 2021-05-28 NOTE — TELEPHONE ENCOUNTER
Called pt for appointment reminder.   -Pt confirmed      Electronically signed by Marian Kimble MA on 5/28/2021 at 2:16 PM

## 2021-06-01 ENCOUNTER — OFFICE VISIT (OUTPATIENT)
Dept: PSYCHIATRY | Age: 56
End: 2021-06-01
Payer: COMMERCIAL

## 2021-06-01 DIAGNOSIS — F41.1 GAD (GENERALIZED ANXIETY DISORDER): Primary | ICD-10-CM

## 2021-06-01 PROCEDURE — 90834 PSYTX W PT 45 MINUTES: CPT | Performed by: SOCIAL WORKER

## 2021-06-01 SDOH — ECONOMIC STABILITY: FOOD INSECURITY: WITHIN THE PAST 12 MONTHS, YOU WORRIED THAT YOUR FOOD WOULD RUN OUT BEFORE YOU GOT MONEY TO BUY MORE.: NEVER TRUE

## 2021-06-01 SDOH — ECONOMIC STABILITY: HOUSING INSECURITY: IN THE LAST 12 MONTHS, HOW MANY PLACES HAVE YOU LIVED?: 1

## 2021-06-01 SDOH — ECONOMIC STABILITY: TRANSPORTATION INSECURITY
IN THE PAST 12 MONTHS, HAS LACK OF TRANSPORTATION KEPT YOU FROM MEETINGS, WORK, OR FROM GETTING THINGS NEEDED FOR DAILY LIVING?: NO

## 2021-06-01 SDOH — ECONOMIC STABILITY: INCOME INSECURITY: IN THE LAST 12 MONTHS, WAS THERE A TIME WHEN YOU WERE NOT ABLE TO PAY THE MORTGAGE OR RENT ON TIME?: NO

## 2021-06-01 SDOH — ECONOMIC STABILITY: TRANSPORTATION INSECURITY
IN THE PAST 12 MONTHS, HAS THE LACK OF TRANSPORTATION KEPT YOU FROM MEDICAL APPOINTMENTS OR FROM GETTING MEDICATIONS?: NO

## 2021-06-01 SDOH — HEALTH STABILITY: PHYSICAL HEALTH: ON AVERAGE, HOW MANY DAYS PER WEEK DO YOU ENGAGE IN MODERATE TO STRENUOUS EXERCISE (LIKE A BRISK WALK)?: 7 DAYS

## 2021-06-01 SDOH — HEALTH STABILITY: PHYSICAL HEALTH: ON AVERAGE, HOW MANY MINUTES DO YOU ENGAGE IN EXERCISE AT THIS LEVEL?: 40 MIN

## 2021-06-01 SDOH — ECONOMIC STABILITY: FOOD INSECURITY: WITHIN THE PAST 12 MONTHS, THE FOOD YOU BOUGHT JUST DIDN'T LAST AND YOU DIDN'T HAVE MONEY TO GET MORE.: NEVER TRUE

## 2021-06-01 SDOH — ECONOMIC STABILITY: HOUSING INSECURITY
IN THE LAST 12 MONTHS, WAS THERE A TIME WHEN YOU DID NOT HAVE A STEADY PLACE TO SLEEP OR SLEPT IN A SHELTER (INCLUDING NOW)?: NO

## 2021-06-01 ASSESSMENT — ANXIETY QUESTIONNAIRES
GAD7 TOTAL SCORE: 3
6. BECOMING EASILY ANNOYED OR IRRITABLE: 0-NOT AT ALL
1. FEELING NERVOUS, ANXIOUS, OR ON EDGE: 2-OVER HALF THE DAYS
4. TROUBLE RELAXING: 0-NOT AT ALL
5. BEING SO RESTLESS THAT IT IS HARD TO SIT STILL: 0-NOT AT ALL
7. FEELING AFRAID AS IF SOMETHING AWFUL MIGHT HAPPEN: 0-NOT AT ALL
2. NOT BEING ABLE TO STOP OR CONTROL WORRYING: 1-SEVERAL DAYS
3. WORRYING TOO MUCH ABOUT DIFFERENT THINGS: 0-NOT AT ALL

## 2021-06-01 ASSESSMENT — SOCIAL DETERMINANTS OF HEALTH (SDOH)
IN A TYPICAL WEEK, HOW MANY TIMES DO YOU TALK ON THE PHONE WITH FAMILY, FRIENDS, OR NEIGHBORS?: ONCE A WEEK
WITHIN THE LAST YEAR, HAVE YOU BEEN KICKED, HIT, SLAPPED, OR OTHERWISE PHYSICALLY HURT BY YOUR PARTNER OR EX-PARTNER?: NO
HOW HARD IS IT FOR YOU TO PAY FOR THE VERY BASICS LIKE FOOD, HOUSING, MEDICAL CARE, AND HEATING?: NOT HARD AT ALL
ARE YOU MARRIED, WIDOWED, DIVORCED, SEPARATED, NEVER MARRIED, OR LIVING WITH A PARTNER?: NEVER MARRIED
HOW OFTEN DO YOU ATTENT MEETINGS OF THE CLUB OR ORGANIZATION YOU BELONG TO?: NEVER
WITHIN THE LAST YEAR, HAVE YOU BEEN AFRAID OF YOUR PARTNER OR EX-PARTNER?: NO
WITHIN THE LAST YEAR, HAVE YOU BEEN HUMILIATED OR EMOTIONALLY ABUSED IN OTHER WAYS BY YOUR PARTNER OR EX-PARTNER?: NO
WITHIN THE LAST YEAR, HAVE TO BEEN RAPED OR FORCED TO HAVE ANY KIND OF SEXUAL ACTIVITY BY YOUR PARTNER OR EX-PARTNER?: NO
HOW OFTEN DO YOU ATTEND CHURCH OR RELIGIOUS SERVICES?: NEVER
HOW OFTEN DO YOU GET TOGETHER WITH FRIENDS OR RELATIVES?: ONCE A WEEK
DO YOU BELONG TO ANY CLUBS OR ORGANIZATIONS SUCH AS CHURCH GROUPS UNIONS, FRATERNAL OR ATHLETIC GROUPS, OR SCHOOL GROUPS?: YES

## 2021-06-01 ASSESSMENT — PATIENT HEALTH QUESTIONNAIRE - PHQ9
SUM OF ALL RESPONSES TO PHQ9 QUESTIONS 1 & 2: 2
9. THOUGHTS THAT YOU WOULD BE BETTER OFF DEAD, OR OF HURTING YOURSELF: 0
5. POOR APPETITE OR OVEREATING: 0
SUM OF ALL RESPONSES TO PHQ QUESTIONS 1-9: 8
SUM OF ALL RESPONSES TO PHQ QUESTIONS 1-9: 8
10. IF YOU CHECKED OFF ANY PROBLEMS, HOW DIFFICULT HAVE THESE PROBLEMS MADE IT FOR YOU TO DO YOUR WORK, TAKE CARE OF THINGS AT HOME, OR GET ALONG WITH OTHER PEOPLE: 0
7. TROUBLE CONCENTRATING ON THINGS, SUCH AS READING THE NEWSPAPER OR WATCHING TELEVISION: 0
8. MOVING OR SPEAKING SO SLOWLY THAT OTHER PEOPLE COULD HAVE NOTICED. OR THE OPPOSITE, BEING SO FIGETY OR RESTLESS THAT YOU HAVE BEEN MOVING AROUND A LOT MORE THAN USUAL: 3
4. FEELING TIRED OR HAVING LITTLE ENERGY: 0
SUM OF ALL RESPONSES TO PHQ QUESTIONS 1-9: 8
3. TROUBLE FALLING OR STAYING ASLEEP: 3
2. FEELING DOWN, DEPRESSED OR HOPELESS: 0
6. FEELING BAD ABOUT YOURSELF - OR THAT YOU ARE A FAILURE OR HAVE LET YOURSELF OR YOUR FAMILY DOWN: 0
1. LITTLE INTEREST OR PLEASURE IN DOING THINGS: 2

## 2021-06-01 ASSESSMENT — LIFESTYLE VARIABLES
HOW OFTEN DO YOU HAVE A DRINK CONTAINING ALCOHOL: NEVER
HOW MANY STANDARD DRINKS CONTAINING ALCOHOL DO YOU HAVE ON A TYPICAL DAY: 1 OR 2

## 2021-06-01 NOTE — PROGRESS NOTES
Initial Session Note  Moose Okeefe MSW, LCSW  6/1/2021  1:56 PM  2:46 PM    Patient location  : [address] [place] [address]  LCSW location : [address] [place] [address]      Time spent with Patient: 50 minutes  This is patient's FIRST  Therapy appointment. Reason for Consult:  anxiety  Referring Provider: No referring provider defined for this encounter. Pt provided informed consent for the behavioral health program. Discussed with patient model of service to include the limits of confidentiality (i.e. abuse reporting, suicide intervention, etc.) and short-term intervention focused approach. Discussed no show and late cancellation policy. Pt indicated understanding. Carolee Mata ,a 60 y.o. male, for initial evaluation visit. Reason:    Pt answered all assessment questions. Pt reported he thought his medications were doing good, continues to have poor sleep and physical agitation in his leg. Pt denied referral to psychiatric medication provider. Pt reported he would like to continue the 2 weeks appointments and verbalized he would call sooner if needed. Pt denies Suicidal Ideations, Homicidal Ideation, Auditory Hallucinations, Visual Hallucinations, Tactical Hallucinations. Assessments:  PHQ- Score: 8 ~  59 Mild. ANTONIO-7 Score: 3 ~ 04: minimal anxiety.      Current Medications:  Scheduled Meds:   Current Outpatient Medications:     irbesartan (AVAPRO) 75 MG tablet, , Disp: , Rfl:     RaNITidine HCl (ZANTAC PO), Take by mouth, Disp: , Rfl:     pioglitazone (ACTOS) 30 MG tablet, Take 1 tablet by mouth daily, Disp: , Rfl: 0    rosuvastatin (CRESTOR) 10 MG tablet, Take 20 mg by mouth daily , Disp: , Rfl:     Multiple Vitamins-Minerals (THERAPEUTIC MULTIVITAMIN-MINERALS) tablet, Take 1 tablet by mouth daily, Disp: , Rfl:     sertraline (ZOLOFT) 100 MG tablet, TAKE 1 TABLET ONCE A DAY, Disp: , Rfl: 3    clonazePAM (KLONOPIN) 2 MG tablet, TAKE 1 TABLET BY MOUTH 3 TIMES A DAY FOR 30 DAYS, Disp: , Rfl: 2    lithium 300 MG capsule, TAKE 1 CAPSULE BY MOUTH THREE TIMES A DAY, Disp: , Rfl: 3    ziprasidone (GEODON) 40 MG capsule, TAKE ONE CAPSULE BY MOUTH TWICE A DAY WITH FOOD, Disp: , Rfl: 3    FISH OIL, 2,000 mg by Does not apply route 2 times daily , Disp: , Rfl:     Ascorbic Acid (VITAMIN C) 500 MG tablet, Take 1,000 mg by mouth daily , Disp: , Rfl:       History:     Past Psychiatric History:   Previous therapy: yes  Previous psychiatric treatment and medication trials: yes  Previous psychiatric hospitalizations: yes- 12 yrs ago for alcoholism   Previous diagnoses: yes  Previous suicide attempts:no  Family history of mental illness: yes- Maternal Uncle schizophrenia and completed suicide, father and brother are alcoholics  History of violence: no  Currently in treatment with PCP.   Education: college graduate  Other Pertinent History: Trauma, death of parents  Legal Issues- Any current charges/court dates: no    Substance Abuse History:  Recreational drugs: pt denies  Use of Alcohol: denied- sober 12 years  Tobacco use:yes, smokeless- 2 cans  Legal consequences of chemical use: no  Patient feels she ought to cut down on drinking and/or drug use:no  Patient has been annoyed by others criticizing her drinking or drug use: no  Patient has felt bad or guilty about her drinking or drug use:no  Patient has had a drink or used drugs as an eye opener first thing in the morning to steady nerves, get rid of a hangover or get the day started:no  Use of OTC: pt denies    Patient Active Problem List   Diagnosis    Family history of colonic polyps    GERD (gastroesophageal reflux disease)    Diabetes mellitus ([facility]    Hyperlipidemia    HTN (hypertension)    Bipolar affective disorder, depressed ([facility]    Depression    Sleep apnea    S/P colonoscopy with polypectomy    Family history of polyps in the colon    History of colonic polyps    Rectal burning    Rectal itching    Rectal pain    BRBPR (bright red blood no  any drugs: no  alcohol: no     Mental Status Evaluation:     Appearance:  age appropriate and within normal Limits   Behavior:  Psychomotor Agitation   Speech:  normal pitch and normal volume   Mood:  anxious   Affect:  mood-congruent   Thought Process:  within normal limits   Thought Content:  within normal limits   Sensorium:  person, place, time/date and situation   Cognition:  grossly intact   Insight:  fair   Judgment:  fair     Suicidal Intentions: No  Suicidal Plan:  No    Other Pertinent Information:  Social Support system:yes, brother, aunt, cousin  Current relationship:no, 10-20 years ago was a dating relationship   Relies on others for help:yes   Independent self care:yes   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 Continue medication management  2. CBT to target cognitive distortions  3.  Discuss therapeutic goals      Pt interventions:  Provided education, Discussed self-care (sleep, nutrition, rewarding activities, social support, exercise), Established rapport, Supportive techniques, Emphasized self-care as important for managing overall health and Identified maladaptive thoughts       CAMERON Oneal

## 2021-06-01 NOTE — PATIENT INSTRUCTIONS
THINKING ERRORS    1. Mind reading: You assume that you know what people think without having sufficient evidence of their thoughts. \"He thinks I'm a loser. \"    2. Fortune telling: You predict the future- that things will get worse or that there is danger ahead. \"I'll fail that exam\" and \"I won't get the job. \"    3. Catastrophizing: You believe that what has happened or will happen will be so awful and unbearable that you won't be able to stand it. \"It would be terrible if I failed. \"    4. Labeling: You assign global negative traits to yourself and others. \"Im a failure\" or \"He's a rotten person. \"    5. Discounting positives: You claim that the positives that you or others attain are trivial. \"That's what wives are supposed to do--so it doesn't count when she's nice to me. \" \"Those successes were easy, so they don't matter. \"    6. Negative filter: You focus almost exclusively on the negatives and seldom notice the positives. \"Look at all of the people who don't like me. \"    7. Overgeneralizing: You perceive a global pattern of negatives on the basis of a single incident. \"This generally happens to me. I seem to fail at a lot of things. \"    8. Dichotomous thinking: You view events, or people, in all-or-nothing terms. \"I get rejected by everyone\" or \"It was a waste of time. \"    9. Shoulds: You interpret events in terms of how things should be or should have gone rather than simply focusing on what is. \"I should do well. If I don't, then I'm a failure. \"    10. Personalizing: You attribute a disproportionate amount of the blame to yourself for negative events and fail to see that certain events are also caused by others. \"The marriage ended because I failed\"    6. Blaming: You focus on the other person as the source of your negative feelings and you refuse to take responsibility for changing yourself. \"She's to blame for the way I feel now\" or \"My parents caused all my problems. \"    12. Unfair comparisons:  You interpret events in terms of standards that are unrealistic-for example, you focus primarily on others who do better than you and find yourself inferior in the comparison. \"She's more successful than I am\" or \"Others did better than I did on the test.\"    13. Regret orientation: You focus on the idea that you could have done better in the past, rather on what you can do better now. \"I could have had a better job if I had tried\". (best friends with the Shoulds)     15. What if?: You keep asking a series of questions about \"What if\" something happens and fail to be satisfied with any of the answers. \"Yeah, but what if I get anxious? Or what if I can't catch my breath? \"    15. Emotional reasoning: You let your feelings guide your interpretation of reality-for example, \"I feel depressed, therefore my marriage is not working out. \"    16. Inability to disconfirm: You reject any evidence or arguments that might contradict your negative thoughts. For example, when you have the thought \"I'm unlovable\", you reject as irrelevant any evidence that people like you. Consequently, your thought cannot be refuted. \"That's not the real issue. There are deeper problems. There are other factors. \"    17. Judgment Focus: You view yourself, others and events in terms of evaluations of good, bad or superior-inferior, rather than simply describing, accepting, or understanding. You are continually measuring yourself and others according to arbitrary standards, finding that you and others fall short. You are focused on the judgments of others as well as your own judgments of yourself. \"I didn't perform well in college\" or \"If I take up tennis, I won't do well\" or \"Look how successful she is. I'm not successful\". 15 Ways to Untwist Your Thinking    1. Identify the Distortions. Write down the negative thought and the distortions in each negative thought using the Cognitive Distortions Checklist.    2. The Straight-Forward Approach.

## 2021-06-04 PROCEDURE — 95810 POLYSOM 6/> YRS 4/> PARAM: CPT | Performed by: PSYCHIATRY & NEUROLOGY

## 2021-06-12 DIAGNOSIS — G47.33 OBSTRUCTIVE SLEEP APNEA: Primary | ICD-10-CM

## 2021-07-08 ENCOUNTER — TELEPHONE (OUTPATIENT)
Dept: PSYCHIATRY | Age: 56
End: 2021-07-08

## 2021-07-08 NOTE — TELEPHONE ENCOUNTER
Called pt for appointment reminder.   -left voicemail, requesting a return call      Electronically signed by Fadumo Drake MA on 7/8/2021 at 2:35 PM

## 2021-07-09 ENCOUNTER — OFFICE VISIT (OUTPATIENT)
Dept: PSYCHIATRY | Age: 56
End: 2021-07-09
Payer: COMMERCIAL

## 2021-07-09 DIAGNOSIS — F41.1 GAD (GENERALIZED ANXIETY DISORDER): Primary | ICD-10-CM

## 2021-07-09 PROCEDURE — 90837 PSYTX W PT 60 MINUTES: CPT | Performed by: SOCIAL WORKER

## 2021-07-09 NOTE — PROGRESS NOTES
Therapy Progress Note  Lupe Olivier MSW, LCSW  7/9/2021  1:53 PM  2:48 PM    Patient location  : [address] [place] [address]  [facility] location : [address] [place] [address]      Time spent with Patient: 55 minutes  This is patient's second  Therapy appointment. Reason for Consult:  anxiety  Referring Provider: No referring provider defined for this encounter. Atilio Samayoa ,a 60 y.o. male, for initial evaluation visit. Pt provided informed consent for the behavioral health program. Discussed with patient model of service to include the limits of confidentiality (i.e. abuse reporting, suicide intervention, etc.) and short-term intervention focused approach. Discussed no show and late cancellation policy. Pt indicated understanding. S:  Pt reported he has been doing fine since last appointment. Pt reported largest stressor was sleep/insomia and he has an appointment on 8/26 to discuss his concern's with his dr. Provided sleep hygiene handout. Pt showed an anxiety workbook he has been reading and working through. Pt reported while utilizing the workbook he has come to realize he struggles with purpose in life. Discussed [employer] purpose and other ways to be productive to increase the feeling of having a purpose. Discussed a daily scheduled, seeking educational classes at [employer], extension office, [place] [id number], and volunteering at his [employer]. Pt reported he would like to continue already scheduled appointments and verbalized he would call sooner if needed. Pt denies Suicidal Ideations, Homicidal Ideation, Auditory Hallucinations, Visual Hallucinations, Tactical Hallucinations.     MSE:    Appearance    alert, cooperative, mild distress  Appetite normal  Sleep disturbance Yes  Fatigue Yes  Loss of pleasure No  Impulsive behavior No  Speech    normal rate and normal volume  Mood    Anxious  Affect    anxiety  Thought Content    intact  Thought Process    goal directed  Associations    logical connections  Insight    Fair  Judgment    Intact  Orientation    oriented to person, place, time, and general circumstances  Memory    recent and remote memory intact  Attention/Concentration    intact  Morbid ideation No  Suicide Assessment    no suicidal ideation      History:  Social History     Socioeconomic History    Marital status: Single     Spouse name: Not on file    Number of children: Not on file    Years of education: Not on file    Highest education level: Not on file   Occupational History    Not on file   Tobacco Use    Smoking status: Never Smoker    Smokeless tobacco: Current User     Types: Snuff   Substance and Sexual Activity    Alcohol use: No     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    Drug use: No    Sexual activity: Not on file   Other Topics Concern    Not on file   Social History Narrative    Not on file     Social Determinants of Health     Financial Resource Strain: Low Risk     Difficulty of Paying Living Expenses: Not hard at all   Food Insecurity: No Food Insecurity    Worried About [address] in the Last Year: Never true    [facility] [address] in the Last Year: Never true   Transportation Needs: No Transportation Needs    Lack of Transportation (Medical): No    Lack of Transportation (Non-Medical): No   Physical Activity: Sufficiently Active    Days of Exercise per Week: 7 days    Minutes of Exercise per Session: 40 min   Stress: No Stress Concern Present    Feeling of Stress : Not at all   Social Connections: Socially Isolated    Frequency of Communication with Friends and Family: Once a week    Frequency of Social Gatherings with Friends and Family: Once a week    Attends Hinduism Services: Never    Active Member of Clubs or Organizations:  Yes    Attends Club or Organization Meetings: Never    Marital Status: Never    Intimate Partner Violence: Not At Risk    Fear of Current or Ex-Partner: No    Emotionally Abused: No    Physically Abused: No    Sexually Abused: No       Medications:   Current Outpatient Medications   Medication Sig Dispense Refill    irbesartan (AVAPRO) 75 MG tablet       RaNITidine HCl (ZANTAC PO) Take by mouth      pioglitazone (ACTOS) 30 MG tablet Take 1 tablet by mouth daily  0    rosuvastatin (CRESTOR) 10 MG tablet Take 20 mg by mouth daily       Multiple Vitamins-Minerals (THERAPEUTIC MULTIVITAMIN-MINERALS) tablet Take 1 tablet by mouth daily      sertraline (ZOLOFT) 100 MG tablet TAKE 1 TABLET ONCE A DAY  3    clonazePAM (KLONOPIN) 2 MG tablet TAKE 1 TABLET BY MOUTH 3 TIMES A DAY FOR 30 DAYS  2    lithium 300 MG capsule TAKE 1 CAPSULE BY MOUTH THREE TIMES A DAY  3    ziprasidone (GEODON) 40 MG capsule TAKE ONE CAPSULE BY MOUTH TWICE A DAY WITH FOOD  3    FISH OIL 2,000 mg by Does not apply route 2 times daily       Ascorbic Acid (VITAMIN C) 500 MG tablet Take 1,000 mg by mouth daily        No current facility-administered medications for this visit. 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    Drug use: No    Sexual activity: Not on file   Other Topics Concern    Not on file   Social History Narrative    Not on file     Social Determinants of Health     Financial Resource Strain: Low Risk     Difficulty of Paying Living Expenses: Not hard at all   Food Insecurity: No Food Insecurity    Worried About Running Out of Food in the Last Year: Never true    Shayan of Food in the Last Year: Never true   Transportation Needs: No Transportation Needs    Lack of Transportation (Medical):  No    Lack of Transportation (Non-Medical): No   Physical Activity: Sufficiently Active    Days of Exercise per Week: 7 days    Minutes of Exercise per Session: 40 min   Stress: No Stress Concern Present    Feeling of Stress : Not at all   Social Connections: Socially Isolated    Frequency of Communication with Friends and Family: Once a week    Frequency of Social Gatherings with Friends and Family: Once a week    Attends [employer]aritan Services: Never    Active Member of Clubs or Organizations: Yes    Attends Club or Organization Meetings: Never    Marital Status: Never    Intimate Partner Violence: Not At Risk    Fear of Current or Ex-Partner: No    Emotionally Abused: No    Physically Abused: No    Sexually Abused: No       TOBACCO:   reports that he has never smoked. His smokeless tobacco use includes snuff. ETOH:   reports no history of alcohol use. Family History:   Family History   Problem Relation Age of Onset    Colon Polyps Mother     Colon Cancer Neg Hx     Esophageal Cancer Neg Hx     Liver Cancer Neg Hx     Liver Disease Neg Hx     Rectal Cancer Neg Hx     Stomach Cancer Neg Hx        Diagnosis:    The encounter diagnosis was ANTONIO (generalized anxiety disorder). Diagnosis Date    BiPAP (biphasic positive airway pressure) dependence     20cm/15cm (using an old device; previously prescribed auto CPAP)    Bipolar 1 disorder (HCC)     Depression     GERD (gastroesophageal reflux disease)     Hyperlipidemia     Hypertension     pt staes he takes no bp medication    Kidney disease     Obstructive sleep apnea     AHI:  73.3 per HST, 2/2017    Type II or unspecified type diabetes mellitus without mention of complication, not stated as uncontrolled     Unspecified sleep apnea      Other psychosocial and environmental problems    Plan:  1. Continue medication management  2. CBT to target cognitive distortions  3.  Discuss therapeutic goals    Pt interventions:  Trained in strategies for increasing balanced thinking, Provided education, Discussed self-care (sleep, nutrition, rewarding activities, social support, exercise), Established rapport, Supportive techniques, Emphasized self-care as important for managing overall health and Identified maladaptive thoughts      Sindy Snow MSW, LCSW

## 2021-07-23 ENCOUNTER — OFFICE VISIT (OUTPATIENT)
Dept: PSYCHIATRY | Age: 56
End: 2021-07-23
Payer: COMMERCIAL

## 2021-07-23 DIAGNOSIS — F41.1 GAD (GENERALIZED ANXIETY DISORDER): Primary | ICD-10-CM

## 2021-07-23 PROCEDURE — 90837 PSYTX W PT 60 MINUTES: CPT | Performed by: SOCIAL WORKER

## 2021-07-23 NOTE — PATIENT INSTRUCTIONS
Has your self-esteem gone into hibernation with the bears or fallen by the wayside like the leaves on the trees? Before you let one more moment slip away, put the spring back into your step without waiting for the thaw. Whether your self-esteem is still going strong, fading, has disappeared all together, it makes sense for all us to nurture our confidence. Confidence equips us to face the tough and unpleasant times and allows for saskia and pleasure when living is easy. Here are 6 ways to give a boost to your self-esteem:  1. Recognize and embrace your positive qualities. Make a list of all your assets including skills, experiences, physical and social resources, talents, and anything else that makes you feel good about yourself. Add to the list the compliments that others have given you as well. Reminding yourself of all your assets is a sure confidence booster. 2. Accept that you are a desirable package rather than any one individual item. Accept that you are not perfect. Don't let any one particular shortcoming negate that you are a complex, multifaceted desirable package. See the desirable package that you are by taking a picture of yourself with a big smile on your face and post it on your bathroom mirror. Every time you look at your smile think of each of the positive characteristics that define you. 3. Trust that you are competent. Remind yourself of all the problems you have faced and tackled. Have urmila that even if you cannot deal with a problem yourself that you will have the ingenuity to get the help, skills, or knowledge that you need. Doubting your capability? Take on a new challenge and prove to yourself you CAN rather than you can't. Pay attention to each accomplishment regardless of how small and make a mental note or, even better, keep a note pad. Give yourself extra credit when it required significant effort and don't forget to pat yourself on the back. 4. Believe in your own worth.  Recognize that means making yourself a priority some of the time. Think of something you want to do and do it. Give yourself permission to say no, ask for what you want, or maneuver into a position to make it happen. Be prepared to tolerate disappointing others for the sake of recognizing that you matter and taking care of yourself. 5. Think back to most recent experience where you felt you fell short, made a mistake, or messed-up and force yourself to name five things that went right. We are not defined by any one thing but by the accumulation of our experiences. 6. Look in a full size mirror and pick five things that you are looking at that make you feel good. You can pick five things you see on the outside such as your physical attributes: eyes, lips, hair, nails, legs, feet, toes, or smile. You can also pick things related to your style such as: your hairdo, clothing, stance, make-up, shoes, or jewelry. In addition, you can pick items that come from the inside such as: your sparkle, spirit, energy, compassion, or kindness. Having trouble? Ask someone that matters to you to name five things you have to feel good about yourself. Try this every day for a week.     Nirmal Ashford, a licensed psychologist, is the co-author of Think Confident, Be Confident

## 2021-07-23 NOTE — PROGRESS NOTES
Therapy Progress Note  Owen Neil MSW, [facility]  7/23/2021  1:51 PM  2:55 PM    Patient location  : [address] [place] [address] location : [address] [place] [address]      Time spent with Patient: 64 minutes  This is patient's third  Therapy appointment. Reason for Consult:  anxiety  Referring Provider: No referring provider defined for this encounter. Binh Gonzalez ,a 60 y.o. male, for initial evaluation visit. Pt provided informed consent for the behavioral health program. Discussed with patient model of service to include the limits of confidentiality (i.e. abuse reporting, suicide intervention, etc.) and short-term intervention focused approach. Discussed no show and late cancellation policy. Pt indicated understanding. S:  Pt reported he has followed through on searching for a leather working group in [place] and online, however has not been able to find any. 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 Pt shared thoughts related to his purpose in life and the COVID situation with people who have been vaccinated still getting sick. Pt spoke about building up strength in his arms to return to pistol shooting as a hobby. Pt has good insight and follow through, also continues to work through his anxiety workbook. Pt reported he would like to continue the already scheduled appointments and verbalized he would call sooner if needed. Pt denies Suicidal Ideations, Homicidal Ideation, Auditory Hallucinations, Visual Hallucinations, Tactical Hallucinations.     MSE:    Appearance    alert, cooperative, mild distress  Appetite normal  Sleep disturbance No  Fatigue No  Loss of pleasure No  Impulsive behavior No  Speech    normal rate and normal volume  Mood    Anxious  Affect    normal affect  Thought Content    intact  Thought Process    goal directed  Associations    logical connections  Insight    Fair  Judgment    Intact  Orientation    oriented to person, place, time, and general circumstances  Memory    recent and remote memory intact  Attention/Concentration    intact  Morbid ideation No  Suicide Assessment    no suicidal ideation      History:  Social History     Socioeconomic History    Marital status: Single     Spouse name: Not on file    Number of children: Not on file    Years of education: Not on file    Highest education level: Not on file   Occupational History    Not on file   Tobacco Use    Smoking status: Never Smoker    Smokeless tobacco: Current User     Types: Snuff   Substance and Sexual Activity    Alcohol use: No     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    Drug use: No    Sexual activity: Not on file   Other Topics Concern    Not on file   Social History Narrative    Not on file     Social Determinants of Health     Financial Resource Strain: Low Risk     Difficulty of Paying Living Expenses: Not hard at all   Food Insecurity: No Food Insecurity    Worried About [address] in the Last Year: Never true    [address] in the Last Year: Never true   Transportation Needs: No Transportation Needs    Lack of Transportation (Medical): No    Lack of Transportation (Non-Medical): No   Physical Activity: Sufficiently Active    Days of Exercise per Week: 7 days    Minutes of Exercise per Session: 40 min   Stress: No Stress Concern Present    Feeling of Stress : Not at all   Social Connections: Socially Isolated    Frequency of Communication with Friends and Family: Once a week    Frequency of Social Gatherings with Friends and Family: Once a week    Attends Anabaptist Services: Never    Active Member of Clubs or Organizations:  Yes    Attends Club or Organization Meetings: Never    Marital Status: Never    Intimate Partner Violence: Not At Risk    Fear of Current or Ex-Partner: No    Emotionally Abused: No    Physically Abused: No    Sexually Abused: No       Medications:   Current Outpatient Medications   Medication Sig Dispense Refill    irbesartan (AVAPRO) 75 MG tablet       RaNITidine HCl (ZANTAC PO) Take by mouth      pioglitazone (ACTOS) 30 MG tablet Take 1 tablet by mouth daily  0    rosuvastatin (CRESTOR) 10 MG tablet Take 20 mg by mouth daily       Multiple Vitamins-Minerals (THERAPEUTIC MULTIVITAMIN-MINERALS) tablet Take 1 tablet by mouth daily      sertraline (ZOLOFT) 100 MG tablet TAKE 1 TABLET ONCE A DAY  3    clonazePAM (KLONOPIN) 2 MG tablet TAKE 1 TABLET BY MOUTH 3 TIMES A DAY FOR 30 DAYS  2    lithium 300 MG capsule TAKE 1 CAPSULE BY MOUTH THREE TIMES A DAY  3    ziprasidone (GEODON) 40 MG capsule TAKE ONE CAPSULE BY MOUTH TWICE A DAY WITH FOOD  3    FISH OIL 2,000 mg by Does not apply route 2 times daily       Ascorbic Acid (VITAMIN C) 500 MG tablet Take 1,000 mg by mouth daily        No current facility-administered medications for this visit. 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    Drug use: No    Sexual activity: Not on file   Other Topics Concern    Not on file   Social History Narrative    Not on file     Social Determinants of Health     Financial Resource Strain: Low Risk     Difficulty of Paying Living Expenses: Not hard at all   Food Insecurity: No Food Insecurity    Worried About Running Out of Food in the Last Year: Never true    Shayan of Food in the Last Year: Never true   Transportation Needs: No Transportation Needs    Lack of Transportation (Medical): No    Lack of Transportation (Non-Medical):  No   Physical Activity: Sufficiently Active    Days of Exercise per Week: 7 days    Minutes of Exercise per Session: 40 min   Stress: No Stress Concern Present    Feeling of Stress : Not at all   Social Connections: Socially Isolated    Frequency of Communication with Friends and Family: Once a week    Frequency of Social Gatherings with Friends and Family: Once a week    Attends Restorationism Services: Never    Active Member of Clubs or Organizations: Yes    Attends Club or Organization Meetings: Never    Marital Status: Never    Intimate Partner Violence: Not At Risk    Fear of Current or Ex-Partner: No    Emotionally Abused: No    Physically Abused: No    Sexually Abused: No       TOBACCO:   reports that he has never smoked. His smokeless tobacco use includes snuff. ETOH:   reports no history of alcohol use. Family History:   Family History   Problem Relation Age of Onset    Colon Polyps Mother     Colon Cancer Neg Hx     Esophageal Cancer Neg Hx     Liver Cancer Neg Hx     Liver Disease Neg Hx     Rectal Cancer Neg Hx     Stomach Cancer Neg Hx        Diagnosis:    The encounter diagnosis was ANTONIO (generalized anxiety disorder). Diagnosis Date    BiPAP (biphasic positive airway pressure) dependence     20cm/15cm (using an old device; previously prescribed auto CPAP)    Bipolar 1 disorder (HCC)     Depression     GERD (gastroesophageal reflux disease)     Hyperlipidemia     Hypertension     pt staes he takes no bp medication    Kidney disease     Obstructive sleep apnea     AHI:  73.3 per HST, 2/2017    Type II or unspecified type diabetes mellitus without mention of complication, not stated as uncontrolled     Unspecified sleep apnea      Other psychosocial and environmental problems    Plan:  1. Continue medication management  2. CBT to target cognitive distortions  3.  Discuss therapeutic goals    Pt interventions:  Trained in strategies for increasing balanced thinking, Provided education, Discussed self-care (sleep, nutrition, rewarding

## 2021-08-06 ENCOUNTER — OFFICE VISIT (OUTPATIENT)
Dept: PSYCHIATRY | Age: 56
End: 2021-08-06
Payer: COMMERCIAL

## 2021-08-06 DIAGNOSIS — F41.1 GAD (GENERALIZED ANXIETY DISORDER): Primary | ICD-10-CM

## 2021-08-06 PROCEDURE — 90837 PSYTX W PT 60 MINUTES: CPT | Performed by: SOCIAL WORKER

## 2021-08-06 NOTE — PATIENT INSTRUCTIONS
Diaphragmatic Breathing      What Is Diaphragmatic Breathing? Diaphragmatic breathing is a technique that helps you slow down your breathing when feeling stressed or anxious by using your diaphragm muscle to bring about a state of physiological relaxation.  babies naturally breathe this way, and singers, wind instrument players, and yoga practitioners also use this type of breathing. The diaphragm is a large muscle that rests across the bottom of your rib cage. When you inhale, the diaphragm muscle drops, opening up space so air can come in. When watching someone do this, it looks like your stomach is filling with air. This type of breathing helps activate the part of your nervous system that controls relaxation. It can lead to decreased heart rate, blood pressure, decreased muscle tension, and overall feelings of relaxation. Why Is Diaphragmatic Breathing Important? ? Our breathing changes when we are feeling anxious. We tend to take short,  quick, shallow breaths, or even hyperventilate; this is called overbreathing.    ? It is a good idea to learn techniques for managing overbreathing, because this  type of breathing can actually make you feel even more anxious!    ? Diaphragmatic breathing is a great portable tool that you can use whenever you are feeling anxious. However, it does require some practice. Key point: Like other anxiety-management skills, the purpose of calm  breathing is not to avoid anxiety at all costs, but just to take the edge off or  help you ride out the feelings. Why Be Concerned With How Im Breathing?  To increase your awareness of the role that breathing plays in physical tension and your bodys stress response.  To lower your level of stress-related arousal and tension.  To give you a method of taking calm, relaxing breaths to break the cycle of increasing arousal during stressful situations.        What Is the Best Way To Use Diaphragmatic Breathing Exercises?  Use diaphragmatic breathing frequently.  Take deep breaths at the first signs of stress, anxiety, physical tension, or other symptoms.  Schedule time for relaxation. How to Do It  Diaphragmatic breathing involves taking smooth, slow, and regular breaths. Sitting upright can increase the capacity of your lungs to fill with air. It is best to 'take the weight' off your shoulders by supporting your arms on the side-arms of a chair, or on your lap. You may also choose to practice breathing while lying down as well. 1. Take a slow breath in through the nose, breathing into your lower abdomen (for about 4 seconds)   2. Hold your breath for 4 seconds   3. Exhale slowly through the mouth (for about 4 seconds)   4. Wait 4 seconds before taking another breath    5. Repeat    About 6-8 breathing cycles per minute is often helpful to decrease anxiety, but find your  own comfortable breathing rhythm. These cycles regulate the amount of oxygen you  take in so that you do not experience the fainting, tingling, and giddy sensations that are  sometimes associated with overbreathing. Helpful Hints  Make sure that you arent hyperventitating; it is important to pause for a second or two after each breath. Try to breathe from your diaphragm or abdomen. Your shoulders and chest area  should be fairly relaxed and still. If this is challenging at first, it can be helpful to  first try this exercise by lying down on the floor with one hand on your heart, the  other hand on your abdomen. Watch the hand on your abdomen rise as you fill  your lungs with air, expanding your chest.     Rules of Practice   Try diaphragmatic breathing for at least five minutes, twice a day. You do not need to be feeling anxious to practice  in fact, at first you  should practice while feeling relatively calm.  You need to be comfortable  breathing this way when feeling calm before you can feel comfortable doing it  when anxious. Serean Rodriguez gradually master this skill and feel the benefits! Once you are comfortable with this technique, you will feel more comfortable using it in situations that cause anxiety. Mini Relaxation Techniques    When you have one minute  1. Place your hand on your stomach so you can feel the gentle rise and fall as you breathe. 2.  Breathe in slowly  3. Pause for the count of three. 4.  Breathe out. 5.  Pause for the count of three. 6.  Continue to breathe deeply for one minute. 7.  As you breathe, repeat to yourself, \"I am\" as you breathe in and \"at peace\" as you breathe out. 8.  Feel your entire body relax into the support of your chair. When you have two minutes  1. Count down slowly from 10 to zero. 2.  With each number, take one slow breath, inhaling and exhaling. 3.  Breath in deeply saying \"10\" to yourself. 4.  Breathe out slowly. 5.  Continue counting down slowly. When you reach zero, you should feel more relaxed. When you have three minutes  1. While sitting down, check your body for tension. 2.  Relax your facial muscles and allow your jaw to fall open. 3.  Let your shoulders drop. 4.  Let your arms fall to your sides. 5.  Allow your hands to loosen so that there are spaces between your fingers. 6.  Uncross your ankles or legs. 7.  Feel your thighs sink into your chair, let your legs fall comfortably apart. 8.  Feel your shins and calves become heavier and your feet rest heavy on the floor. 9.  Now breathe in slowly and out slowly. 10.  Each time you breathe out try to relax even more. Relaxation:  Diaphragmatic Breathing             ______________________________________________________________________________    1. Sit in a comfortable position  2. Place one hand on your stomach and the other on your chest  3.   Try to breathe so that only your stomach rises and falls    As you inhale, concentrate on your chest remaining relatively still while your stomach rises. It may be helpful for you to imagine that your pants are too big and you need to push your stomach out to hold them up. When exhaling, allow your stomach to fall in and the air to fully escape. Inhale slowly. You may choose to hold the air in for about a second. Exhale slowly. Dont push the air out, but just let the natural pressure of your body slowly move it out. It is normal for this healthy method of breathing to feel a little awkward at first.  With practice, it will feel more natural.    4.  Take some deep breaths, concentrating on only moving your stomach. Hold each            breath for 2 to 3 seconds before exhaling. 5.   Get your mind on your side    One other important factor in getting relaxed is your mind. Your mind and body are connected. The mind influences the body and the body influences the mind. What you do with your mind when you are trying to relax is very important. The key is to avoid thinking about stressful things. You can think about      Neutral things (e.g., counting, saying a word like calm or relax)   Pleasant things (e.g., imagining a pleasant place)    6. Return to regular breathing, continuing to breathe so that only your stomach moves. 7.  It is recommended that you practice 2 times per day, 10 minutes each time. Deep Breathing Exercises      Exercise 1:  The Stimulating Breath (also called the Jennifer Breath)   Its aim is to raise energy level and increase alertness. - Inhale and exhale rapidly through your nose, keeping your mouth closed but relaxed. Your breaths in and out should be equal in duration, but as short as possible. This is a noisy breathing exercise. - Try for three in-and-out breath cycles per second. This produces a quick movement of the diaphragm, suggesting a jennifer. Breathe normally after each cycle.   - Do not do for more than 15 seconds on your first try. Each time you practice the Stimulating Breath, you can increase your time by five seconds or so, until you reach a full minute. If done properly, you may feel invigorated, comparable to the heightened awareness you feel after a good workout. You should feel the effort at the back of the neck, the diaphragm, the chest and the abdomen. Try this breathing exercise the next time you need an energy boost and feel yourself reaching for a cup of coffee. Exercise 2:  The 4-7-8 (or Relaxing Breath) Exercise  This exercise is utterly simple, takes almost no time, requires no equipment and can be done anywhere. Although you can do the exercise in any position, sit with your back straight while learning the exercise. Place the tip of your tongue against the ridge of tissue just behind your upper front teeth, and keep it there through the entire exercise. You will be exhaling through your mouth around your tongue; try pursing your lips slightly if this seems awkward.  Exhale completely through your mouth, making a whoosh sound.  Close your mouth and inhale quietly through your nose to a mental count of four.  Hold your breath for a count of seven.  Exhale completely through your mouth, making a whoosh sound to a count of eight.  This is one breath. Now inhale again and repeat the cycle three more times for a total of four breaths. Note that you always inhale quietly through your nose and exhale audibly through your mouth. The tip of your tongue stays in position the whole time. Exhalation takes twice as long as inhalation. The absolute time you spend on each phase is not important; the ratio of 4:7:8 is important. If you have trouble holding your breath, speed the exercise up but keep to the ratio of 4:7:8 for the three phases. With practice you can slow it all down and get used to inhaling and exhaling more and more deeply. This exercise is a natural tranquilizer for the nervous system.  Unlike tranquilizing drugs, which are often effective when you first take them but then lose their power over time, this exercise is subtle when you first try it but gains in power with repetition and practice. Do it at least twice a day. You cannot do it too frequently. Do NOT do more than 4 breaths at one time for the first month of practice. Later, if you wish, you can extend it to eight breaths. If you feel a little lightheaded when you first breathe this way, do not be concerned; it will pass. Once you develop this technique by practicing it every day, it will be a very useful tool that you will always have with you. Use it whenever anything upsetting happens - before you react. Use it whenever you are aware of internal tension. Use it to help you fall asleep. This exercise cannot be recommended too highly. Everyone can benefit from it. Exercise 3: Meditation exercise  Breath Counting  If you want to get a feel for this challenging work, try your hand at breath counting, a deceptively simple technique. Sit in a comfortable position with the spine straight and head inclined slightly forward. Gently close your eyes and take a few deep breaths. Then let the breath come naturally without trying to influence it. Ideally it will be quiet and slow, but depth and rhythm may vary.  To begin the exercise, count \"one\" to yourself as you exhale.  The next time you exhale, count \"two,\" and so on up to [place]. \"   Then begin a new cycle, counting \"one\" on the next exhalation. Never count higher than \"five,\" and count only when you exhale. You will know your attention has wandered when you find yourself up to \"eight,\" \"12,\" even \"19. \"  Try to do 10 minutes of this form of meditation. Deep Breathing       What Is Deep Breathing? Deep breathing involves using your diaphragm muscle to help bring about a state of physiological relaxation. The diaphragm is a large muscle that rests across the bottom of your rib cage.  When you inhale, the diaphragm muscle drops, opening up space so air can come in. When watching someone do this it looks like your stomach is filling with air. This type of breathing helps activate the part of your nervous system that controls relaxation. It can lead to decreased heart rate, blood pressure, decreased muscle tension, and overall feelings of relaxation. Why Be Concerned With How Im Breathing?  To increase your awareness of the role that breathing plays in increased physical tension and in contributing to increasing your bodys stress response.  To lower your level of stress-related arousal and tension.  To give you a method of taking calm, relaxing breaths to break the cycle of increasing arousal during stressful situations. What Is the Best Way To Use Deep Breathing Exercises?  Use deep breathing frequently.  Take deep breaths at the first signs of stress, anxiety, physical tension, or other symptoms.  Schedule time for relaxation. My scheduled time for deep breathing will be                                           Progressive Muscle Relaxation (8 Muscle Groups)           For each muscle group, tense the muscles involved about 1/3 to 2/3 of the maximum tension possible (enough to feel tension but not any pain). Hold in the tensed position for about 4 seconds, then let the muscles relax in their natural resting positions for about 40 seconds. 1. Both Arms:  Turn your palms up, then make a fist.  Bring your fists up to your shoulders while tensing the biceps. 2. Both Legs:  Lift both legs off of the ground, straighten your knees, and point your toes toward your head. 3. Abdomen:  Tighten these muscles as if you were about to be hit in the stomach. 4. Chest:  Take a very deep breath (through your upper chest, not your diaphragm) and hold it. 5. Shoulders:  Lift both shoulders up toward your ears.     6. Back of Neck:  Tuck in and lower your chin toward your chest.    7. Eyes:  Squint. 8. Forehead:  Raise your eyebrows. Apps for Anxiety/Relaxation/Mindfulness:    \"Calm\"    \"[id number]\"    \"PTSD \"    \"ARIEL Self Help for Anxiety Management\"    \"MindShift\"    \"Relax Lite\"    \"Centered State\"    \"Deep Breathe\"    \"Rita Bud\"    \"Headspace\"    \"What's Up? \" (grounding, breathing)    Anxiety In Order    Essence     Apps for Worry:    Worry Watch (1.99)    Worry Time (free)      Apps for Mood Monitoring/Tracking:    \"Optimism\"    \"Pacifica\"    \"T2 Mood Tracker\"    \"Moodtrack\"    Apps for Thought Tracking/Challenging: \"Thought Diary\"    \"Sumanth\"      Apps for Parenting:  \"Ireward\"    Touch Autism website has a number of apps specifically for children on the spectrum, including apps about learning to \"wait\" and safety issues. Kids Emotional Regulation Apps:    \"Smiling Mind\"--mindfulness and meditation for kids seven through adults    \"Mamaphant the Elephant\" Emotional recognition and breathing    \"Personal Beasties Breathing\"--breathing sebastian    \"Anxiety in Order\" breathing sebastian (with flower)    \"iChill\" (teens with PTSD)    \"Breathe, Think, Do\" ([place])      Gratitude Apps:   Happify     Gratitude! (visual gratitude journal)     ACT apps:  ACT  (free)  ACT  (9.99)    OCD:  Live OCD free (29.99)

## 2021-08-06 NOTE — PROGRESS NOTES
Therapy Progress Note  Maurice Vinayak MSNANI, [facility]  8/6/2021  2:05 PM  3:05 PM    Patient location  : [address] [place] [address]  [facility] location : [address] [place] [address]      Time spent with Patient: 60 minutes  This is patient's fourth  Therapy appointment. Reason for Consult:  anxiety  Referring Provider: No referring provider defined for this encounter. Jack Overton ,a 60 y.o. male, for initial evaluation visit. Pt provided informed consent for the behavioral health program. Discussed with patient model of service to include the limits of confidentiality (i.e. abuse reporting, suicide intervention, etc.) and short-term intervention focused approach. Discussed no show and late cancellation policy. Pt indicated understanding. S:  Pt shared about his anxiety, what coping skills he has been utilizing, and inquired about deep breathing. Discussed several techniques and practiced several. Pt reported issues with his CPap and plans to address this with his neurologist.   Pt reported he would like to continue as needed. Pt denies Suicidal Ideations, Homicidal Ideation, Auditory Hallucinations, Visual Hallucinations, Tactical Hallucinations.       MSE:    Appearance    alert, cooperative, mild distress  Appetite normal  Sleep disturbance Yes  Fatigue No  Loss of pleasure No  Impulsive behavior No  Speech    normal rate and normal volume  Mood    Anxious  Affect    normal affect  Thought Content    intact  Thought Process    goal directed  Associations    logical connections  Insight    Fair  Judgment    Intact  Orientation    oriented to person, place, time, and general circumstances  Memory    recent and remote memory intact  Attention/Concentration    intact  Morbid ideation No  Suicide Assessment    no suicidal ideation      History:  Social History     Socioeconomic History    Marital status: Single     Spouse name: Not on file    Number of children: Not on file    Years of education: Not on file    Highest education level: Not on file   Occupational History    Not on file   Tobacco Use    Smoking status: Never Smoker    Smokeless tobacco: Current User     Types: Snuff   Substance and Sexual Activity    Alcohol use: No     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    Drug use: No    Sexual activity: Not on file   Other Topics Concern    Not on file   Social History Narrative    Not on file     Social Determinants of Health     Financial Resource Strain: Low Risk     Difficulty of Paying Living Expenses: Not hard at all   Food Insecurity: No Food Insecurity    Worried About [address] [place] [address] in the Last Year: Never true    Shayan of Food in the Last Year: Never true   Transportation Needs: No Transportation Needs    Lack of Transportation (Medical): No    Lack of Transportation (Non-Medical): No   Physical Activity: Sufficiently Active    Days of Exercise per Week: 7 days    Minutes of Exercise per Session: 40 min   Stress: No Stress Concern Present    Feeling of Stress : Not at all   Social Connections: Socially Isolated    Frequency of Communication with Friends and Family: Once a week    Frequency of Social Gatherings with Friends and Family: Once a week    Attends Hindu Services: Never    Active Member of Clubs or Organizations:  Yes    Attends Club or Organization Meetings: Never    Marital Status: Never    Intimate Partner Violence: Not At Risk    Fear of Current or Ex-Partner: No    Emotionally Abused: No    Physically Abused: No    Sexually Abused: No       Medications:   Current Outpatient Medications   Medication Sig Dispense Refill    irbesartan (AVAPRO) 75 MG tablet       RaNITidine HCl (ZANTAC PO) Take by mouth      pioglitazone (ACTOS) 30 MG tablet Take 1 tablet by mouth daily  0    rosuvastatin (CRESTOR) 10 MG tablet Take 20 mg by mouth daily       Multiple Vitamins-Minerals (THERAPEUTIC MULTIVITAMIN-MINERALS) tablet Take 1 tablet by mouth daily      sertraline (ZOLOFT) 100 MG tablet TAKE 1 TABLET ONCE A DAY  3    clonazePAM (KLONOPIN) 2 MG tablet TAKE 1 TABLET BY MOUTH 3 TIMES A DAY FOR 30 DAYS  2    lithium 300 MG capsule TAKE 1 CAPSULE BY MOUTH THREE TIMES A DAY  3    ziprasidone (GEODON) 40 MG capsule TAKE ONE CAPSULE BY MOUTH TWICE A DAY WITH FOOD  3    FISH OIL 2,000 mg by Does not apply route 2 times daily       Ascorbic Acid (VITAMIN C) 500 MG tablet Take 1,000 mg by mouth daily        No current facility-administered medications for this visit. 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    Drug use: No    Sexual activity: Not on file   Other Topics Concern    Not on file   Social History Narrative    Not on file     Social Determinants of Health     Financial Resource Strain: Low Risk     Difficulty of Paying Living Expenses: Not hard at all   Food Insecurity: No Food Insecurity    Worried About Running Out of Food in the Last Year: Never true    Shayan of Food in the Last Year: Never true   Transportation Needs: No Transportation Needs    Lack of Transportation (Medical): No    Lack of Transportation (Non-Medical):  No   Physical Activity: Sufficiently Active    Days of Exercise per Week: 7 days    Minutes of Exercise per Session: 40 min   Stress: No Stress Concern Present    Feeling of Stress : Not at all   Social Connections: Socially Isolated    Frequency of Communication with Friends and Family: Once a week    Frequency of Social Gatherings with Friends and Family: Once a week    Attends Mormon Services: Never    Active Member of Clubs or Organizations: Yes    Attends Club or Organization Meetings: Never    Marital Status: Never    Intimate Partner Violence: Not At Risk    Fear of Current or Ex-Partner: No    Emotionally Abused: No    Physically Abused: No    Sexually Abused: No       TOBACCO:   reports that he has never smoked. His smokeless tobacco use includes snuff. ETOH:   reports no history of alcohol use. Family History:   Family History   Problem Relation Age of Onset    Colon Polyps Mother     Colon Cancer Neg Hx     Esophageal Cancer Neg Hx     Liver Cancer Neg Hx     Liver Disease Neg Hx     Rectal Cancer Neg Hx     Stomach Cancer Neg Hx        Diagnosis:    The encounter diagnosis was ANTONIO (generalized anxiety disorder). Diagnosis Date    BiPAP (biphasic positive airway pressure) dependence     20cm/15cm (using an old device; previously prescribed auto CPAP)    Bipolar 1 disorder (HCC)     Depression     GERD (gastroesophageal reflux disease)     Hyperlipidemia     Hypertension     pt staes he takes no bp medication    Kidney disease     Obstructive sleep apnea     AHI:  73.3 per HST, 2/2017    Type II or unspecified type diabetes mellitus without mention of complication, not stated as uncontrolled     Unspecified sleep apnea      no problems    Plan:  1. Continue medication management  2. CBT to target cognitive distortions  3.  Discuss therapeutic goals    Pt interventions:  Trained in strategies for increasing balanced thinking, Provided education, Discussed self-care (sleep, nutrition, rewarding activities, social support, exercise), Established rapport, Supportive techniques, Emphasized self-care as important for managing overall health and Identified maladaptive thoughts      Cleave Cocourtney MSW, LCSW

## 2021-08-26 ENCOUNTER — OFFICE VISIT (OUTPATIENT)
Dept: NEUROLOGY | Age: 56
End: 2021-08-26
Payer: COMMERCIAL

## 2021-08-26 VITALS
DIASTOLIC BLOOD PRESSURE: 75 MMHG | HEIGHT: 72 IN | SYSTOLIC BLOOD PRESSURE: 123 MMHG | HEART RATE: 78 BPM | RESPIRATION RATE: 16 BRPM | WEIGHT: 264 LBS | BODY MASS INDEX: 35.76 KG/M2

## 2021-08-26 DIAGNOSIS — G47.33 OBSTRUCTIVE SLEEP APNEA: Primary | ICD-10-CM

## 2021-08-26 DIAGNOSIS — F99 INSOMNIA DUE TO OTHER MENTAL DISORDER: ICD-10-CM

## 2021-08-26 DIAGNOSIS — G25.81 RESTLESS LEGS SYNDROME: ICD-10-CM

## 2021-08-26 DIAGNOSIS — F51.05 INSOMNIA DUE TO OTHER MENTAL DISORDER: ICD-10-CM

## 2021-08-26 PROCEDURE — 99214 OFFICE O/P EST MOD 30 MIN: CPT | Performed by: PSYCHIATRY & NEUROLOGY

## 2021-08-26 RX ORDER — AMITRIPTYLINE HYDROCHLORIDE 25 MG/1
TABLET, FILM COATED ORAL
Qty: 60 TABLET | Refills: 5 | Status: SHIPPED | OUTPATIENT
Start: 2021-08-26 | End: 2022-03-07

## 2021-08-26 RX ORDER — FENOFIBRATE 145 MG/1
TABLET, COATED ORAL
COMMUNITY

## 2021-08-26 NOTE — LETTER
[place] [address]  [place], [address]  Phone [phone] Fax [phone]     Patient Name: Alexander Steele 2021  : 1965  Age: 60 y.o.   Patient Address: [address] [place] [address]       Patient Phone: [phone] (home)     REFERRAL  Referred to: DME provider of patient's choice  Alexander Steele is referred for the following:    DME Equipment HPCPS Code Setting   Auto Adjusting BiPAP device with flex or comparable pressure relief per comfort  Min EPAP: 10cm to   Max IPAP: 20cm,   Pressure Support Range:  4cm to 5cm   Heated Humidifier  Patient Choice       Replinishible PAP Supplies, 1 year supply  Item HPCPS Code Frequency   Mask of choice  or  1 per 3 months   Nasal Mask cushion/pillows  or  2 per 30 days   Full Face Mask Interface  1 per 30 days   Headgear  1 per 6 months   Tubing, length of choice  or  1 per 3 months   Water Chamber  1 per 6 months   Chinstrap  1 per 6 months   Disposable Filters  2 per 30 days   Reusable Filters  1 per 6 months     Diagnoses:  Obstructive sleep apnea (G47.33)  Length of Need: Lifetime, 99    Ordering Provider: JAMI Thorpe Butt: [id number]         Signature:       Date: 2021      Electronically Signed by Lucy Woods M.D.

## 2021-08-26 NOTE — PROGRESS NOTES
[facility]  [address] [place] [address] Drive, [address] [facility] [address]  Bora Clark  Phone [phone]  Fax [phone]     [address], [address] Follow Up Encounter  21 1:30 PM CDT    Information:   Patient Name: Yann Garza  :   1965  Age:   60 y.o. MRN:   [id number]  Account #:  [de-identified]  Today:  21    Provider: Lisa Ledezma M.D. Chief Complaint:   Chief Complaint   Patient presents with    Follow-up     insomnia    Sleep Apnea       Subjective:   Yann Garza is a 60 y.o. man with a history of obstructive sleep apnea who is following up. He has used BiPAP for years. His machine has finally given out and he had a sleep study to qualify her for another BiPAP. The sleep study showed UMM with an AHI of 20 with a low saturation of 83%. He has difficulty maintaining sleep. He sleeps 4 or 5 hours, wakens and cannot get back to sleep. He has tried [address] and Trazodone that did not help much. He does take clonazepam.  He has RLS but it does not keep him up at night.         Objective:     Past Medical History:  Past Medical History:   Diagnosis Date    BiPAP (biphasic positive airway pressure) dependence     20cm/15cm (using an old device; previously prescribed auto CPAP)    Bipolar 1 disorder (HCC)     Depression     GERD (gastroesophageal reflux disease)     Hyperlipidemia     Hypertension     pt staes he takes no bp medication    Kidney disease     Obstructive sleep apnea     AHI:  73.3 per HST, 2017    Type II or unspecified type diabetes mellitus without mention of complication, not stated as uncontrolled     Unspecified sleep apnea        Past Surgical History:   Procedure Laterality Date    BELPHAROPTOSIS REPAIR      x2    COLONOSCOPY  2012    Dr Mikhail Cardenas    COLONOSCOPY N/A 2016    Dr Paige-external hemorrhoids, tubular AP (-) dysplasia x 2--5 yr recall       Recent Hospitalizations  · None    Significant Injuries  · None    Habits  Ezekiel Bridgeman Jamas Landau reports that he has never smoked. His smokeless tobacco use includes snuff. He reports that he does not drink alcohol and does not use drugs. Family History   Problem Relation Age of Onset    Colon Polyps Mother     Colon Cancer Neg Hx     Esophageal Cancer Neg Hx     Liver Cancer Neg Hx     Liver Disease Neg Hx     Rectal Cancer Neg Hx     Stomach Cancer Neg Hx        Social History  Anabel Hernandez is single, lives in [address] [place] [address], and is retired. Medications:  Current Outpatient Medications   Medication Sig Dispense Refill    fenofibrate (TRICOR) 145 MG tablet 1 tablet with food      irbesartan (AVAPRO) 75 MG tablet       pioglitazone (ACTOS) 30 MG tablet Take 15 mg by mouth daily   0    rosuvastatin (CRESTOR) 10 MG tablet Take 20 mg by mouth daily       Multiple Vitamins-Minerals (THERAPEUTIC MULTIVITAMIN-MINERALS) tablet Take 1 tablet by mouth daily      sertraline (ZOLOFT) 100 MG tablet TAKE 1 TABLET ONCE A DAY  3    clonazePAM (KLONOPIN) 2 MG tablet TAKE 1 TABLET BY MOUTH 3 TIMES A DAY FOR 30 DAYS  2    lithium 300 MG capsule 600 mg 600mg BID  3    ziprasidone (GEODON) 40 MG capsule 80 mg 2 times daily (with meals)   3    FISH OIL 2,000 mg by Does not apply route 2 times daily        No current facility-administered medications for this visit. Allergies:   Allergies as of 08/26/2021 - Fully Reviewed 08/26/2021   Allergen Reaction Noted    Neosporin [bacitracin-neomycin-polymyxin]  09/18/2012       Review of Systems:  Constitutional: negative for - chills and fever  Eyes:  negative for - visual disturbance and photophobia  HENMT: negative for - headaches and sinus pain  Respiratory: negative for - cough, hemoptysis, and shortness of breath  Cardiovascular: negative for - chest pain and palpitations  Gastrointestinal: negative for - blood in stools, constipation, diarrhea, nausea, and vomiting  Genito-Urinary: negative for - hematuria, urinary frequency, urinary urgency, and urinary tongue weakness  Motor:  Normal strength in both upper and lower extremities. Normal muscle tone and bulk. Deep tendon reflexes are intact and symmetrical in both upper and lower extremities. Choi's signs are absent bilaterally. There is no ankle clonus on either side. Sensation:  Sensation is intact to light touch and vibration in all extremities. Coordination:  Rapid alternating movements are normal in both upper and lower extremities. Finger to nose testing is unimpaired bilaterally. Gait:  Normal station and gait. Pertinent Diagnostic Studies:      Assessment:       ICD-10-CM    1. Obstructive sleep apnea  G47.33    2. Insomnia due to other mental disorder  F51.05     F99    3. Restless legs syndrome  G25.81      I discussed the diagnosis of obstructive sleep apnea with Anton Deal including the pathophysiology (namely the mechanism of breathing and obstruction of upper airway, interruptions of sleep, hypoxemia, hypercapnia, and results of repetitive sympathetic activation), risks, evaluation, and treatment options. I discussed the risks of driving when drowsy and advised that Anton Deal not drive when drowsy and avoid sedating medications and respiratory suppressants. Plan:   1. Continue use of BiPAP during sleep. Will try and get you a new device. Order sent to DME  2. Try taking amitriptyline at bedtime 25 mg, 1 or 2 for sleep maintenance.     3. FU in 6 months    Electronically signed by Dwayne Ham MD on 8/26/21

## 2021-08-26 NOTE — PATIENT INSTRUCTIONS
INSTRUCTIONS:  1. Continue use of BiPAP during sleep. Will try and get you a new device. 2. Try taking amitriptyline at bedtime 25 mg, 1 or 2 for sleep maintenance.

## 2021-10-12 ENCOUNTER — TELEPHONE (OUTPATIENT)
Dept: NEUROLOGY | Age: 56
End: 2021-10-12

## 2021-10-12 NOTE — TELEPHONE ENCOUNTER
Called and spoke with patient to let him know that he will need to take his sleep machine/memory card to [facility] for them to do a download report and have them fax it our office.

## 2021-10-13 ENCOUNTER — OFFICE VISIT (OUTPATIENT)
Dept: NEUROLOGY | Age: 56
End: 2021-10-13
Payer: COMMERCIAL

## 2021-10-13 VITALS
HEART RATE: 90 BPM | BODY MASS INDEX: 35.76 KG/M2 | SYSTOLIC BLOOD PRESSURE: 113 MMHG | HEIGHT: 72 IN | DIASTOLIC BLOOD PRESSURE: 77 MMHG | WEIGHT: 264 LBS

## 2021-10-13 DIAGNOSIS — F99 INSOMNIA DUE TO OTHER MENTAL DISORDER: ICD-10-CM

## 2021-10-13 DIAGNOSIS — G47.33 OBSTRUCTIVE SLEEP APNEA: Primary | ICD-10-CM

## 2021-10-13 DIAGNOSIS — Z99.89 BIPAP (BIPHASIC POSITIVE AIRWAY PRESSURE) DEPENDENCE: ICD-10-CM

## 2021-10-13 DIAGNOSIS — F51.05 INSOMNIA DUE TO OTHER MENTAL DISORDER: ICD-10-CM

## 2021-10-13 DIAGNOSIS — G25.81 RESTLESS LEGS SYNDROME: ICD-10-CM

## 2021-10-13 PROCEDURE — 99214 OFFICE O/P EST MOD 30 MIN: CPT | Performed by: PHYSICIAN ASSISTANT

## 2021-10-13 NOTE — LETTER
[facility]  [address] [place] [address] Drive, [address] [place] [address]  Phone [phone]  Fax [phone]             Re:  Soraida Failing    10/13/21  :  1965  Address: [address] [place] [address]       Replinishible PAP Supplies, 1 year supply  Item HPCPS Code Frequency   Mask of choice  or  1 per 3 months   Nasal Mask cushion/pillows  or  2 per 30 days   Full Face Mask Interface  1 per 30 days   Headgear  1 per 6 months   Tubing, length of choice  or  1 per 3 months   Water Chamber  1 per 6 months   Chinstrap  1 per 6 months   Disposable Filters  2 per 30 days   Reusable Filters  1 per 6 months     Diagnoses:  Obstructive sleep apnea (G47.33)  Length of Need: Lifetime, 99    Ordering Provider: Malcolm Mitchell PA-C  NPI:  [id number]        Signature: [unfilled]        Date: 10/13/2021      Electronically Signed by Malcolm Mitchell PA-C  on 10/13/2021 at 9:43 AM

## 2021-10-13 NOTE — PROGRESS NOTES
pressure) dependence     20cm/15cm (using an old device; previously prescribed auto CPAP)    Bipolar 1 disorder (HCC)     Depression     GERD (gastroesophageal reflux disease)     Hyperlipidemia     Hypertension     pt staes he takes no bp medication    Kidney disease     Obstructive sleep apnea     AHI:  73.3 per HST, 2/2017    Type II or unspecified type diabetes mellitus without mention of complication, not stated as uncontrolled     Unspecified sleep apnea        Past Surgical History:   Procedure Laterality Date    BELPHAROPTOSIS REPAIR      x2    COLONOSCOPY  8-    Dr Pa Standing 11/9/2016    Dr Paige-external hemorrhoids, tubular AP (-) dysplasia x 2--5 yr recall       Recent Hospitalizations  ·     Significant Injuries  ·     Family History   Problem Relation Age of Onset    Colon Polyps Mother     Colon Cancer Neg Hx     Esophageal Cancer Neg Hx     Liver Cancer Neg Hx     Liver Disease Neg Hx     Rectal Cancer Neg Hx     Stomach Cancer Neg Hx        Social History  Social History     Tobacco Use   Smoking Status Never Smoker   Smokeless Tobacco Current User    Types: Snuff     Social History     Substance and Sexual Activity   Alcohol Use No    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     Social History     Substance and Sexual Activity   Drug Use No         Current Outpatient Medications   Medication Sig Dispense Refill    fenofibrate (TRICOR) 145 MG tablet 1 tablet with food      amitriptyline (ELAVIL) 25 MG tablet 1 to 2 PO at bedtime 60 tablet 5    irbesartan (AVAPRO) 75 MG tablet       pioglitazone (ACTOS) 30 MG tablet Take 15 mg by mouth daily   0    rosuvastatin (CRESTOR) 10 MG tablet Take 20 mg by mouth daily       Multiple Vitamins-Minerals (THERAPEUTIC MULTIVITAMIN-MINERALS) tablet Take 1 tablet by mouth daily      sertraline (ZOLOFT) 100 MG tablet TAKE 1 TABLET ONCE A DAY  3    clonazePAM (KLONOPIN) 2 MG tablet TAKE 1 TABLET BY MOUTH 3 TIMES A DAY FOR 30 DAYS 2    lithium 300 MG capsule 600 mg 600mg BID  3    ziprasidone (GEODON) 40 MG capsule 80 mg 2 times daily (with meals)   3    FISH OIL 2,000 mg by Does not apply route 2 times daily        No current facility-administered medications for this visit. Allergies:  Neosporin [bacitracin-neomycin-polymyxin]    REVIEW OF SYSTEMS     Constitutional: []? Fever []? Sweats []? Chills []? Recent Injury   [x]? Denies all unless marked  HENT:[]? Headache  []? Head Injury  []? Sore Throat  []? Ear Pain  []? Dizziness []? Hearing Loss   [x]? Denies all unless marked  Musculoskeletal: []? Arthralgia  []? Myalgias []? Muscle cramps  []? Muscle twitches   [x]? Denies all unless marked   Spine:  []? Neck pain  []? Back pain  []? Sciaticia  [x]? Denies all unless marked  Neurological:[]? Visual Disturbance []? Double Vision []? Slurred Speech []? Trouble swallowing  []? Vertigo []? Tingling []? Numbness []? Weakness []? Loss of Balance   []? Loss of Consciousness []? Memory Loss []? Seizures  [x]? Denies all unless marked  Psychiatric/Behavioral:[]? Depression [x]? Anxiety  [x]? Denies all unless marked  Sleep: [x]? Insomnia []? Sleep Disturbance []? Snoring [x]? Restless Legs []? Daytime Sleepiness [x]? Sleep Apnea  [x]? Denies all unless marked    The MA has completed the ROS with the patient. I have reviewed it in its' entirety with the patient and agree with the documentation.        PHYSICAL EXAM  /77   Pulse 90   Ht 6' (1.829 m)   Wt 264 lb (119.7 kg)   BMI 35.80 kg/m²      Constitutional  No acute distress    HEENT- Conjunctiva normal.  No scars, masses, or lesions over external nose or ears, no neck masses noted, no jugular vein distension, no bruit  Cardiac- Regular rate and rhythm  Pulmonary- Clear to auscultation, good expansion, normal effort without use of accessory muscles  Musculoskeletal  No significant wasting of muscles noted, no bony deformities  Extremities - No clubbing, cyanosis or edema  Skin  Warm, dry, and intact. No rash, erythema, or pallor  Psychiatric  Mood, affect, and behavior appear normal      Neurologic:  Extraocular movements are intact without nystagmus. Visual fields are full to confrontation. Facial movements are symmetrical and normal.  Speech is precise. Extremity strength is normal in both uppers and lowers. Deep tendon reflexes are intact and symmetrical.  Rapid alternating movements are unimpaired. Finger-to-nose testing is performed well, without dysmetria. Gait is normal.    I reviewed the following studies:      []  :  Clinical laboratory test results    []  :  Radiology reports    [x]  :  Review and summarization of medical records and/or obtain medical records     [x]  :  Previous/recent polysomnogram report(s)    []  :  [place] Sleepiness Scale           [x]  :  Compliance download: The BiPAP is set at a pressure of 20cm/15cm. Compliance download shows that he uses device: 1100% of the time;  percentage of days with usage >=4 hours: 100%. AHI: 0.4   (Large leaks)-discussed    Assessment:       ICD-10-CM    1. Obstructive sleep apnea  G47.33    2. Restless legs syndrome  G25.81    3. Insomnia due to other mental disorder  F51.05     F99    4. BiPAP (biphasic positive airway pressure) dependence  Z99.89           []  :  Stable     []  :  Improved                       [x]  :  Well controlled              []  :  Resolving     []  :  Resolved     []  :  Inadequately controlled     []  :  Worsening     []  :  Additional workup planned    Patient is compliant and benefiting from therapy as indicated by compliance evaluation and patient report. Plan:     No orders of the defined types were placed in this encounter. 1.   Patient advised of the etiology,  pathophysiology, diagnosis, treatment options, and risks of untreated UMM.  Risks may include, but are not limited to  hypertension, coronary artery disease, diabetes, stroke, weight gain, impaired cognition, daytime somnolence,  and motor vehicle accidents. Advised to abstain from driving or operating heavy machinery when drowsy and the use of respiratory suppressants. 2.  The following educational material has been included in this visit after visit summary for your review: UMM/PAP guidelines/insomnia-Discussed with the patient and all questions fully answered. 3.  Continue BiPAP, but follow up with DME supplier: [employer] device, possible recall on your device. 4.  Order-supplies-[facility]  5. Continue amitriptyline 25 mg  6.   Follow up in 1 year

## 2021-10-13 NOTE — PATIENT INSTRUCTIONS
Patient education: Sleep apnea in adults       INTRODUCTION  Normally during sleep, air moves through the throat and in and out of the lungs at a regular rhythm. In a person with sleep apnea, air movement is periodically diminished or stopped. There are two types of sleep apnea: obstructive sleep apnea and central sleep apnea. In obstructive sleep apnea, breathing is abnormal because of narrowing or closure of the throat. In central sleep apnea, breathing is abnormal because of a change in the breathing control and rhythm. Sleep apnea is a serious condition that can affect a person's ability to safely perform normal daily activities and can affect long term health. Approximately 25 percent of adults are at risk for sleep apnea of some degree. Men are more commonly affected than women. Other risk factors include middle and older age, being overweight or obese, and having a small mouth and throat. This topic review focuses on the most common type of sleep apnea in adults, obstructive sleep apnea (UMM). HOW SLEEP APNEA OCCURS  The throat is surrounded by muscles that control the airway for speaking, swallowing, and breathing. During sleep, these muscles are less active, and this causes the throat to narrow. In most people, this narrowing does not affect breathing. In others, it can cause snoring, sometimes with reduced or completely blocked airflow. A completely blocked airway without airflow is called an obstructive apnea. Partial obstruction with diminished airflow is called a hypopnea. A person may have apnea and hypopnea during sleep. Insufficient breathing due to apnea or hypopnea causes oxygen levels to fall and carbon dioxide to rise. Because the airway is blocked, breathing faster or harder does not help to improve oxygen levels until the airway is reopened. Typically, the obstruction requires the person to awaken to activate the upper airway muscles.  Once the airway is opened, the person then takes several deep breaths to catch up on breathing. As the person awakens, he or she may move briefly, snort or snore, and take a deep breath. Less frequently, a person may awaken completely with a sensation of gasping, smothering, or choking. If the person falls back to sleep quickly, he or she will not remember the event. Many people with sleep apnea are unaware of their abnormal breathing in sleep, and all patients underestimate how often their sleep is interrupted. Awakening from sleep causes sleep to be unrefreshing and causes fatigue and daytime sleepiness. Anatomic causes of obstructive sleep apnea   Most patients have UMM because of a small upper airway. As the bones of the face and skull develop, some people develop a small lower face, a small mouth, and a tongue that seems too large for the mouth. These features are genetically determined, which explains why UMM tends to cluster in families. Obesity is another major factor. Tonsil enlargement can be an important cause, especially in children. SLEEP APNEA SYMPTOMS  The main symptoms of UMM are loud snoring, fatigue, and daytime sleepiness. However, some people have no symptoms. For example, if the person does not have a bed partner, he or she may not be aware of the snoring. Fatigue and sleepiness have many causes and are often attributed to overwork and increasing age. As a result, a person may be slow to recognize that they have a problem. A bed partner or spouse often prompts the patient to seek medical care. Other symptoms may include one or more of the following:  ?Restless sleep  ? Awakening with choking, gasping, or smothering  ? Morning headaches, dry mouth, or sore throat  ? Waking frequently to urinate  ? Awakening unrested, groggy  ? Low energy, difficulty concentrating, memory impairment    Risk factors  Certain factors increase the risk of sleep apnea.   ?Increasing age [de-identified] UMM occurs at all ages, but it is more common in middle and older age adults. ?Male sex  UMM is two times more common in men, especially in middle age. ?Obesity  The more obese a person is, the more likely he or she is to have UMM. ? Sedation from medication or alcohol  This interferes with the ability to awaken from sleep and can lengthen periods of apnea (no breathing), with potentially dangerous consequences. ? Abnormality of the airway. SLEEP APNEA CONSEQUENCES  Complications of sleep apnea can include daytime sleepiness and difficulty concentrating. The consequence of this is an increased risk of accidents and errors in daily activities. Studies have shown that people with severe UMM are more than twice as likely to be involved in a motor vehicle accident as people without these conditions. People with UMM are encouraged to discuss options for driving, working, and performing other high-risk tasks with a healthcare provider. In addition, people with untreated UMM may have an increased risk of cardiovascular problems such as high blood pressure, heart attack, abnormal heart rhythms, or stroke. This risk may be due to changes in the heart rate and blood pressure that occur during sleep. SLEEP APNEA DIAGNOSIS  The diagnosis of UMM is best made by a knowledgeable sleep medicine specialist who has an understanding of the individual's health issues. The diagnosis is usually based upon the person's medical history, physical examination, and testing, including:  ? A complaint of snoring and ineffective sleep  ? Neck size (greater than 16 inches in men or 14 inches in women) is associated with an increased risk of sleep apnea  ? A small upper airway: difficulty seeing the throat because of a tongue that is large for the mouth  ? High blood pressure, especially if it is resistant to treatment  ? If a bed partner has observed the patient during episodes of stopped breathing (apnea), choking, or gasping during sleep, there is a strong possibility of sleep apnea.     Testing is usually performed in a sleep laboratory. A full sleep study is called a polysomnogram. The polysomnogram measures the breathing effort and airflow, blood oxygen level, heart rate and rhythm, duration of the various stages of sleep, body position, and movement of the arms/legs. Home monitoring devices are available that can perform a sleep study. This is a reasonable alternative to conventional testing in a sleep laboratory if the clinician strongly suspects moderate or severe sleep apnea and the patient does not have other illnesses or sleep disorders that may interfere with the results. SLEEP APNEA TREATMENT  Sleep apnea is best treated by a knowledgeable sleep medicine specialist. The goal of treatment is to maintain an open airway during sleep. Effective treatment will eliminate the symptoms of sleep disturbance; long-term health consequences are also reduced. Most treatments require nightly use. The challenge for the clinician and the patient is to select an effective therapy that is appropriate for the patient's problem and that is acceptable for long term use. Auto-titrating CPAP delivers an amount of PAP that varies during the night. The variation is dependent on event detection software algorithms, which will increase the pressure gradually in response to flow changes until adequate patency is detected. After a period of sustained upper airway patency, the delivered level of pressure gradually decreases until the algorithm identifies recurrent upper airway obstruction, at which point the delivered pressure again increases. The result is that the delivered pressure varies throughout the night, in an effort to provide the lowest pressure that is necessary to maintain upper airway patency. Continuous positive airway pressure (CPAP)  The most effective treatment for sleep apnea uses air pressure from a mechanical device to keep the upper airway open during sleep.  A CPAP (continuous positive airway pressure)  device uses an air-tight attachment to the nose, typically a mask, connected to a tube and a blower which generates the pressure. Devices that fit comfortably into the nasal opening, rather than over the nose, are also available. CPAP should be used any time the person sleeps (day or night). The CPAP device is usually used for the first time in the sleep lab, where a technician can adjust the pressure and select the best equipment to keep the airway open. Alternatively, an auto device with a self-adjusting pressure feature, provided with proper education and training, can get treatment started without another sleep test. While the treatment may seem uncomfortable, noisy, or bulky at first, most people accept the treatment after experiencing better sleep. However, difficulty with mask comfort and nasal congestion prevent up to 50 percent of people from using the treatment on a regular basis. Continued follow up with a healthcare provider helps to ensure that the treatment is effective and comfortable. Information from the CPAP machine is often used by physicians, therapists, and insurers to track the success of treatment. CPAP can be delivered with different features to improve comfort and solve problems that may come up during treatment. Changes in treatment may be needed if symptoms do not improve or if the persons condition changes, such as a gain or loss of weight. Adjust sleep position  Adjusting sleep position (to stay off the back) may help improve sleep quality in people who have UMM when sleeping on the back. However, this is difficult to maintain throughout the night and is rarely an adequate solution. Weight loss  Weight loss may be helpful for obese or overweight patients. Weight loss may be accomplished with dietary changes, exercise, and/or surgical treatment.  However, it can be difficult to maintain weight loss; the five-year success of non-surgical weight loss is only 5 percent, meaning that 95 percent of people regain lost weight. Avoid alcohol and other sedatives  Alcohol can worsen sleepiness, potentially increasing the risk of accidents or injury. People with UMM are often counseled to drink little to no alcohol, even during the daytime. Similarly, people who take anti-anxiety medications or sedatives to sleep should speak with their healthcare provider about the safety of these medications. People with UMM must notify all healthcare providers, including surgeons, about their condition and the potential risks of being sedated. People with UMM who are given anesthesia and/or pain medications require special management and close monitoring to reduce the risk of a blocked airway. Dental devices  A dental device, called an oral appliance or mandibular advancement device, can reposition the jaw (mandible), bringing the tongue and soft palate forward as well. This may relieve obstruction in some people. This treatment is excellent for reducing snoring, although the effect on UMM is sometimes more limited. As a result, dental devices are best used for mild cases of UMM when relief of snoring is the main goal. Failure to tolerate and accept CPAP is another indication for dental devices. While dental devices are not as effective as CPAP for UMM, some patients prefer a dental device to CPAP. Side effects of dental devices are generally minor but may include changes to the bite with prolonged use. Surgical treatment  Surgery is an alternative therapy for patients who cannot tolerate or do not improve with nonsurgical treatments such as CPAP or oral devices. Surgery can also be used in combination with other nonsurgical treatments. Surgical procedures reshape structures in the upper airways or surgically reposition bone or soft tissue. Uvulopalatopharyngoplasty (UPPP) removes the uvula and excessive tissue in the throat, including the tonsils, if present.  Other procedures, such as maxillomandibular advancement (MMA), address both the upper and lower pharyngeal airway more globally. UPPP alone has limited success rates (less than 50 percent) and people can relapse (when UMM symptoms return after surgery). As a result, this surgery is only recommended in a minority of people and should be considered with caution. MMA may have a higher success rate, particularly in people with abnormal jaw (maxilla and mandible) anatomy, but it is the most complicated procedure. A newer surgical approach, nerve stimulation to protrude the tongue, has promising success rates in very selected people. Tracheostomy creates a permanent opening in the neck. It is reserved for people with severe disease in whom less drastic measures have failed or are inappropriate. Although it is always successful in eliminating obstructive sleep apnea, tracheostomy requires significant lifestyle changes and carries some serious risks (eg, infection, bleeding, blockage). All surgical treatments require discussions about the goals of treatment, the expected outcomes, and potential complications. Hypoglossal nerve stimulator- \"Inspire\" device    PAP treatment failure:  Possible causes of treatment failure include nonadherence or suboptimal adherence, weight gain, an inappropriate level of prescribed positive pressure, or an additional disorder causing sleepiness (eg, narcolepsy) that may require alterations in the therapeutic regimen. A review of medications should also be undertaken since many drugs may lead to sleepiness. Inadequate sleep time may also negate the expected effects from treatment of UMM. Also, pt's can have persistent hypersomnolence associated with sleep apnea even in the presence of adequate therapy and at those times Provigil or Nuvigil or other stimulants may be indicated.     Once the patient's positive airway pressure therapy has been optimized and symptoms resolved, a regimen of long-term follow-up should be established. Annual visits are reasonable, with more frequent visits in between if new issues arise. The purpose of long-term follow-up is to assess usage and monitor for recurrent UMM, new side effects, air leakage, and fluctuations in body weight. WHERE TO GET MORE INFORMATION  Your healthcare provider is the best source of information for questions and concerns related to your medical problem. Organizations  American [employer] Association  Provides information about sleep apnea to the public, publishes a newsletter, and serves as an advocate for people with the disorder. [place], [address] [facility], [address] [facility]   Nik@[email or web address]. org   [email or web address]. org   Tel: [phone]   Fax: [facility] that works to [employer] and safety by promoting public understanding of sleep and sleep disorders. Supports sleep-related education, research, and advocacy; produces and distributes educational materials to the public and healthcare professionals; and offers postdoctoral fellowships and grants for sleep researchers. [address]   [email or web address]. org   [email or web address]. org   Tel: [phone]   Fax: [phone]    Important information:  Medicare/private insurance CPAP/BiPAP/APAP requirements:  Medicare/private insurance has specific requirements for PAP compliance that must be met during the first 90 days of use to continue coverage for CPAP/BiPAP/APAP  from day 91 and beyond. The policy requires that patients use a PAP device 4 hours per 24 hour period, at least 70% of the time over a 30 day period. This data must be downloaded as a report direct from the PAP devices. This is called a compliance download. Your PAP supplier will assist you in this matter.      Note:  Where applicable, we will utilize PAP device efficiency reports, additional testing, and In general, people with insomnia sleep less or sleep poorly despite having an adequate chance to sleep. The poor sleep may lead to trouble functioning during the daytime. Insomnia is not defined by the number of hours slept because \"sufficient sleep\" can vary from one person to another. Sleep requirements may also decrease with age. Insomnia is the most common sleep complaint in the [facility]. While almost everyone has an occasional night of poor sleep, approximately 10 percent of adults have long-term or chronic insomnia. This article will review the symptoms, causes, and diagnosis of insomnia. Treatment of insomnia is discussed separately. INSOMNIA SYMPTOMS  Common symptoms of insomnia include:  ?Difficulty falling asleep or staying asleep  ? Variable sleep, such as several nights of poor sleep followed by a night of better sleep. ?Daytime fatigue or sleepiness  ? Forgetfulness  ? Poor concentration  ? Irritability  ? Anxiety  ? Depression  ? Reduced motivation or energy  ? Increased errors or accidents  ? Ongoing worry about sleep  For many people, the symptoms of insomnia interfere with personal relationships, job performance, and daily function. People who experienced chronic insomnia may also have an increased risk of automobile accidents compared with people who are fatigued for other reasons. People with insomnia have an impaired sense of sleep. You may feel that you have not slept, even if testing shows that you have. You may also feel more fatigued than individuals without insomnia, even if testing indicates that you are less sleepy. This impaired sense of sleep may be related to a problem with the body's sleep-arousal system, which normally helps you feel awake after sleeping and feel tired before going to bed. One result of poor sleep is that you may become concerned that you will be sleep-deprived and will suffer from serious consequences of lost sleep.  This concern may grow as you are unable to disorder  ? Medical illnesses, especially those that cause pain, stress, or difficulty breathing  ? Neurological disorders, such as Parkinson disease and Alzheimer disease  ? Other sleep disorders, such as sleep apnea, restless legs syndrome, periodic limb movements, and circadian rhythm disorders   ? Medications or illegal drug use  ? Irregular sleep habits  Insomnia can also occur on its own. In some cases, it can begin in childhood or be passed along in families. Short duration sleep and sleep deprivation  Insomnia is frequently confused with short sleep requirement and sleep restriction:  ? Sleeping for only a short period of time is common among people who have insomnia. However, some people normally require little sleep and can function without difficulty after sleeping for only a few hours. People who sleep less but have no residual daytime sleepiness or other symptoms are called short sleepers and do not have a sleep problem. In addition, you may need less sleep as you get older. Needing less sleep does not necessarily mean that you have insomnia unless you also have daytime symptoms, such as sleepiness or dysphoria. ? People who have a reduced time in bed (sleep restriction) as well as those with insomnia sleep for a short time and have difficulty functioning during the daytime. However, people who are sleep restricted will fall asleep quickly and sleep normally if given the opportunity. Chronic loss of sleep, caused by spending fewer than eight hours in bed on most nights, is probably the most common cause of sleepiness. Patients with insomnia are unable to sleep normally when they are given the chance to sleep. INSOMNIA DIAGNOSIS  If you seek help for insomnia, your doctor or nurse will start by asking you how many hours you slept and what problems you have had with sleep over a typical 24-hour period.  Your bed partner or caregiver can help to answer these questions because you may not be aware of what happens while you sleep. You may be asked to keep a daily sleep log, which is a record of sleep times for one to two weeks. Your doctor or nurse may ask other questions to determine the cause of your insomnia. A physical examination may be performed to determine if there are medical or neurologic conditions causing or worsening your sleep problems. Laboratory tests may be recommended to help identify underlying medical or sleep disorders, although this is not required for everyone with insomnia. Laboratory tests may include polysomnography or actigraphy:  ?Polysomnography  Polysomnography is a formal sleep study done in a sleep laboratory. It uses monitors that are attached to your body to record movement, brain activity, breathing, and other physiologic functions. This test may be used when an underlying sleep disorder is suspected or if your insomnia has not responded to treatment. ? Actigraphy  Actigraphy records activity and movement with a monitor or motion detector, generally worn on the wrist throughout the day and night. The test is conducted over one to two weeks at home to gather estimates about how much and at what time you are sleeping. MEDICINES FOR INSOMNIA  Medicines to aid sleep may be recommended if behavioral therapy does not help and your insomnia interferes with your ability to function during the daytime. Discuss sleep medicines with a doctor or nurse. Consider the potential benefits (eg, improved daytime symptoms and function) versus the risks (eg, side effects and addiction) and burdens (eg, cost and effort). If a sleeping medicine is not effective within the first few days, your doctor or nurse may make changes in the medicine or may refer you to a sleep disorder center. Sedative-hypnotic medicines  Sedative-hypnotic medicines work in the brain to cause you to feel sleepy.  The primary differences between the various sedative-hypnotic medicines are how quickly they begin to work and how long the effect lasts. Most clinicians select a medicine based upon your type of insomnia (ie, difficulty falling asleep or staying asleep). Benzodiazepines  Benzodiazepines are an older type of prescription medicine that cause sedation, muscle relaxation, and can lower anxiety levels. Benzodiazepines that were commonly used for the treatment of insomnia include quazepam (Doral), triazolam (Halcion), estazolam (ProSom), temazepam (Restoril), flurazepam (Dalmane), and lorazepam (Ativan). People who take benzodiazepines should be cautious because you may be sleepy in the morning, which can affect driving safety, job performance, and decision-making. Additionally, do not take benzodiazepines with alcohol or other sedating drugs, and do not take more than your doctor or nurse recommends. Benzodiazepines are generally recommended for short-term use only. Nonbenzodiazepines  Nonbenzodiazepines are a class of prescription medicines that are somewhat similar to benzodiazepines. These medications may have fewer side effects compared with benzodiazepines because they work more on sleep centers and less on other areas of the brain. They tend to be short acting, so they are also less likely to produce hangover sedation in the morning. Some can also be prescribed for a longer period of time. Nonbenzodiazepines used to treat insomnia include zaleplon (Sonata), eszopiclone (Lunesta), zolpidem (Ambien), and zolpidem extended release (Ambien CR). Zolpidem is also available as a dissolving tablet (Edluar), an oral liquid spray (Zolpimist), and as a dissolving tablet at a lower dose for middle of the night use (Intermezzo). Do not take these medicines with alcohol or other sedating drugs, and do not take more medicine than your doctor or nurse recommends. Precautions  Sedative-hypnotic medicines should be used with care and certain groups should not use them at all:  ? Pregnant women, due to an increased risk of birth defects  ? People with alcoholism  ? Individuals with kidney, liver, or lung problems  ? People with sleep apnea  ? Individuals who need to make decisions during the night, such as clinicians on-call or single parents caring for children  Side effects  Sedative-hypnotic medicines can have potentially serious side effects:  ?Unusual behaviors such as driving, eating, or having sex after going to sleep have been reported after taking benzodiazepines and nonbenzodiazepines. You may have no memory of this behavior. There is an increased risk of these unusual behaviors if you take the sleeping medicine after drinking alcohol or taking narcotic pain medicines. ? There is a risk of impaired driving the morning after taking some of these medications. This risk is higher in women and in older adults, and both of these groups typically start at the lowest available dose of the medicine. ? Severe allergic reactions (such as anaphylaxis and angioedema) have rarely been reported, sometimes after the first dose. Studies are being conducted to determine which sedative-hypnotic medicines have these risks. You should speak to your doctor or pharmacist about these risks to determine if any precautions are needed. ? There is risk of addiction with continued use of these medications. Ramelteon  Ramelteon (Rozerem) is a prescription medicine approved for insomnia that works in a different brain system (melatonin system) from the other sedative-hypnotic medications. Its benefit is greatest for people who have difficulty falling asleep. It is unlikely to cause morning sleepiness or to be habit-forming. The most common side effects of ramelteon are headache, sleepiness, and sore throat, although these problems occur in fewer than 1 percent of patients. You should not take ramelteon if you have liver disease or take fluvoxamine (Luvox). Ramelteon is available in the [facility], but not in [place].   Suvorexant  Suvorexant (Belsomra) is a prescription medicine that was approved by the [employer] and Drug Administration (FDA) in 2014 for insomnia. It works by blocking a brain chemical called orexin. Under normal conditions, orexin helps you to stay awake. The most common side effect of suvorexant is drowsiness the next day. People who take suvorexant should be cautious because drowsiness in the morning can affect driving safety, job performance, and decision-making. It should also be used with caution in people with obstructive sleep apnea, as it can cause problems with breathing during sleep. Antidepressants  Very low doses of doxepin, an antidepressant, can be used as a treatment for insomnia. The formulation of doxepin for insomnia is called Silenor. This medication may be most helpful for patients with difficulty staying asleep. The most common side effects of Silenor are drowsiness the next day, dry mouth, and dry eyes. Other antidepressants are not approved to treat insomnia, although they are sometimes used because they produce sedation. However, antidepressants may have a limited benefit unless you also have depression. Antidepressants can cause daytime sleepiness and other side effects. Antihistamines  Non-prescription sleep aids such as Nytol, Sominex, and Unisom contain an antihistamine (eg, diphenhydramine/Benadryl). Some of these products also contain a pain reliever (eg, Tylenol PM, Advil PM). You should not take products containing a pain reliever every night, especially if you do not have pain. There is little evidence that these sleep aids are beneficial for treating insomnia. Antihistamines can cause daytime sleepiness and other side effects, such as dry mouth, blurred vision, and difficulty emptying the bladder. Melatonin  Melatonin is a hormone that is normally produced by a gland in the brain. Melatonin does not appear to be helpful in most people who have insomnia, except in people with delayed sleep phase syndrome. Melatonin appears to be safe when used for less than three months. However, melatonin is marketed as a dietary supplement; the ingredients, dose, and purity of dietary supplements are not regulated. ALCOHOL AND SLEEP  People commonly use alcohol as a sleep aid. However, alcohol often interferes with sleep later in the night. When used on a regular basis over the long term, you can become dependent on alcohol and develop severe insomnia if you stop drinking alcohol. Due to numerous health risks, alcohol is not recommended at bedtime for people with insomnia. OTHER TREATMENTS FOR INSOMNIA  A review of complementary approaches in the treatment of insomnia concluded the following: There were some studies showing benefit from the use of acupressure, tiana chi, and yoga; the results were mixed for the use of acupuncture and L-tryptophan; and there was little support for the use of herbal medicines, such as valerian, massage, or aromatherapy. The potential benefits from tiana chi and yoga might be related either to the procedure itself or to the activity involved. Other studies using various levels of exercise as a therapy have also shown mild benefit that correlated with improvement in fitness levels and were independent of an improvement in mood. Most herbal products have not been tested to be sure that they are safe and effective. Therefore, these treatments are not recommended. What can I do to improve my insomnia? You can follow good \"sleep hygiene. \" That means that you:    ? Sleep only long enough to feel rested and then get out of bed  ? Go to bed and get up at the same time every day  ? Do not try to force yourself to sleep. If you can't sleep, get out of bed and try again later. ? Have coffee, tea, and other foods that have caffeine only in the morning  ? Avoid alcohol in the late afternoon, evening, and bedtime  ? Avoid smoking, especially in the evening  ? Keep your bedroom dark, cool, quiet, and free of reminders of work or other things that cause you stress  ? Solve problems you have before you go to bed  ? Exercise several days a week, but not right before bed  ? Avoid looking at phones or reading devices (\"e-books\") that give off light before bed. This can make it harder to fall asleep. Other things that can improve sleep include:  ?Relaxation therapy, in which you focus on relaxing all the muscles in your body 1 by 1  ? Working with a counselor or psychologist to deal with the problems that might be causing poor sleep    WHERE TO [facility] Your healthcare provider is the best source of information for questions and concerns related to your medical problem.

## 2021-10-28 LAB
ALBUMIN SERPL-MCNC: 4.9 G/DL (ref 3.5–5.2)
ALP BLD-CCNC: 31 U/L (ref 40–130)
ALT SERPL-CCNC: 28 U/L (ref 5–41)
ANION GAP SERPL CALCULATED.3IONS-SCNC: 10 MMOL/L (ref 7–19)
AST SERPL-CCNC: 25 U/L (ref 5–40)
BASOPHILS ABSOLUTE: 0.1 K/UL (ref 0–0.2)
BASOPHILS RELATIVE PERCENT: 1 % (ref 0–1)
BILIRUB SERPL-MCNC: 0.3 MG/DL (ref 0.2–1.2)
BUN BLDV-MCNC: 22 MG/DL (ref 6–20)
CALCIUM SERPL-MCNC: 10.3 MG/DL (ref 8.6–10)
CHLORIDE BLD-SCNC: 103 MMOL/L (ref 98–111)
CO2: 24 MMOL/L (ref 22–29)
CREAT SERPL-MCNC: 1.5 MG/DL (ref 0.5–1.2)
CREATININE URINE: 71.8 MG/DL (ref 4.2–622)
EOSINOPHILS ABSOLUTE: 0.1 K/UL (ref 0–0.6)
EOSINOPHILS RELATIVE PERCENT: 2.7 % (ref 0–5)
GFR AFRICAN AMERICAN: 58
GFR NON-AFRICAN AMERICAN: 48
GLUCOSE BLD-MCNC: 115 MG/DL (ref 74–109)
HCT VFR BLD CALC: 43.5 % (ref 42–52)
HEMOGLOBIN: 14.6 G/DL (ref 14–18)
IMMATURE GRANULOCYTES #: 0.1 K/UL
LYMPHOCYTES ABSOLUTE: 1 K/UL (ref 1.1–4.5)
LYMPHOCYTES RELATIVE PERCENT: 19.8 % (ref 20–40)
MCH RBC QN AUTO: 32.1 PG (ref 27–31)
MCHC RBC AUTO-ENTMCNC: 33.6 G/DL (ref 33–37)
MCV RBC AUTO: 95.6 FL (ref 80–94)
MONOCYTES ABSOLUTE: 0.5 K/UL (ref 0–0.9)
MONOCYTES RELATIVE PERCENT: 9.1 % (ref 0–10)
NEUTROPHILS ABSOLUTE: 3.4 K/UL (ref 1.5–7.5)
NEUTROPHILS RELATIVE PERCENT: 66.4 % (ref 50–65)
PARATHYROID HORMONE INTACT: 31.2 PG/ML (ref 15–65)
PDW BLD-RTO: 13.8 % (ref 11.5–14.5)
PLATELET # BLD: 243 K/UL (ref 130–400)
PMV BLD AUTO: 9.9 FL (ref 9.4–12.4)
POTASSIUM SERPL-SCNC: 4 MMOL/L (ref 3.5–5)
PROTEIN PROTEIN: 5 MG/DL (ref 15–45)
RBC # BLD: 4.55 M/UL (ref 4.7–6.1)
SODIUM BLD-SCNC: 137 MMOL/L (ref 136–145)
TOTAL PROTEIN: 7.6 G/DL (ref 6.6–8.7)
VITAMIN D 25-HYDROXY: 59.9 NG/ML
WBC # BLD: 5.1 K/UL (ref 4.8–10.8)

## 2022-03-07 RX ORDER — AMITRIPTYLINE HYDROCHLORIDE 25 MG/1
TABLET, FILM COATED ORAL
Qty: 60 TABLET | Refills: 5 | Status: SHIPPED | OUTPATIENT
Start: 2022-03-07 | End: 2022-08-19

## 2022-03-07 NOTE — TELEPHONE ENCOUNTER
Requested Prescriptions     Pending Prescriptions Disp Refills    amitriptyline (ELAVIL) 25 MG tablet [Pharmacy Med Name: AMITRIPTYLINE HCL 25 MG TAB] 60 tablet 5     Sig: TAKE 1-2 TABLETS BY MOUTH AT BEDTIME       Last Office Visit: 10/13/2021  Next Office Visit: 4/12/2022  Last Medication Refill: 8/26/21 with 5 refills

## 2022-03-18 ENCOUNTER — TELEPHONE (OUTPATIENT)
Dept: BARIATRICS/WEIGHT MGMT | Facility: CLINIC | Age: 57
End: 2022-03-18

## 2022-03-18 NOTE — TELEPHONE ENCOUNTER
Patient was called to remind them of their new patient appointment and to bring completed paperwork or arrive 30 minutes early. The patient was agreeable and voiced an understanding.

## 2022-03-21 ENCOUNTER — TELEPHONE (OUTPATIENT)
Dept: BARIATRICS/WEIGHT MGMT | Facility: CLINIC | Age: 57
End: 2022-03-21

## 2022-03-21 NOTE — TELEPHONE ENCOUNTER
Patient called to cancel his new patient appointment after watching our seminar. He doesn't want to quit smoking at this time. Patient was encouraged to call back when ready.

## 2022-06-07 ENCOUNTER — OFFICE VISIT (OUTPATIENT)
Dept: NEUROLOGY | Age: 57
End: 2022-06-07
Payer: MEDICAID

## 2022-06-07 VITALS
RESPIRATION RATE: 20 BRPM | DIASTOLIC BLOOD PRESSURE: 70 MMHG | WEIGHT: 290 LBS | BODY MASS INDEX: 39.28 KG/M2 | HEART RATE: 87 BPM | HEIGHT: 72 IN | SYSTOLIC BLOOD PRESSURE: 110 MMHG

## 2022-06-07 DIAGNOSIS — G25.81 RESTLESS LEGS SYNDROME: ICD-10-CM

## 2022-06-07 DIAGNOSIS — F51.05 INSOMNIA DUE TO OTHER MENTAL DISORDER: ICD-10-CM

## 2022-06-07 DIAGNOSIS — G47.33 OBSTRUCTIVE SLEEP APNEA: Primary | ICD-10-CM

## 2022-06-07 DIAGNOSIS — F99 INSOMNIA DUE TO OTHER MENTAL DISORDER: ICD-10-CM

## 2022-06-07 PROCEDURE — G8417 CALC BMI ABV UP PARAM F/U: HCPCS | Performed by: PSYCHIATRY & NEUROLOGY

## 2022-06-07 PROCEDURE — G8427 DOCREV CUR MEDS BY ELIG CLIN: HCPCS | Performed by: PSYCHIATRY & NEUROLOGY

## 2022-06-07 PROCEDURE — 3017F COLORECTAL CA SCREEN DOC REV: CPT | Performed by: PSYCHIATRY & NEUROLOGY

## 2022-06-07 PROCEDURE — 4004F PT TOBACCO SCREEN RCVD TLK: CPT | Performed by: PSYCHIATRY & NEUROLOGY

## 2022-06-07 PROCEDURE — 99213 OFFICE O/P EST LOW 20 MIN: CPT | Performed by: PSYCHIATRY & NEUROLOGY

## 2022-06-07 NOTE — PROGRESS NOTES
[facility]  [address] [place] [address] Drive, [address] [address]  Bora Clark  Phone [phone]  Fax [phone]     [facility] Follow Up Encounter  22 3:29 PM CDT    Information:   Patient Name: Autumn Rodriguez  :   1965  Age:   60 y.o. MRN:   [id number]  Account #:  [de-identified]  Today:  22    Provider: Torri Salmon M.D. Chief Complaint:   Chief Complaint   Patient presents with    Follow-up    Sleep Apnea       Subjective:   Autumn Rodriguez is a 60 y.o. man with a history of obstructive sleep apnea, restless legs syndrome, and insomnia who is following up. He has difficulty maintaining sleep. He sleeps 4 or 5 hours, wakens and cannot get back to sleep. He has tried [place] and Trazodone that did not help much. He does take clonazepam.  He has RLS but it does not keep him up at night. He is using his BiPAP nightly and has had no problems with it. Last visit, he was started on amitriptyline at bedtime and that has helped his insomnia. Objective:     Past Medical History:  Past Medical History:   Diagnosis Date    BiPAP (biphasic positive airway pressure) dependence     20cm/15cm (using an old device; previously prescribed auto CPAP)    Bipolar 1 disorder (HCC)     Depression     GERD (gastroesophageal reflux disease)     Hyperlipidemia     Hypertension     pt staes he takes no bp medication    Kidney disease     Obstructive sleep apnea     AHI:  73.3 per HST, 2017    Type II or unspecified type diabetes mellitus without mention of complication, not stated as uncontrolled     Unspecified sleep apnea        Past Surgical History:   Procedure Laterality Date    BELPHAROPTOSIS REPAIR      x2    COLONOSCOPY  2012    Dr Dru Tavarez    COLONOSCOPY N/A 2016    Dr Paige-external hemorrhoids, tubular AP (-) dysplasia x 2--5 yr recall       Recent Hospitalizations  · None    Significant Injuries  · None    Habits  Autumn Rodriguez reports that he has never smoked.  His smokeless tobacco use includes snuff. He reports that he does not drink alcohol and does not use drugs. Family History   Problem Relation Age of Onset    Colon Polyps Mother     Colon Cancer Neg Hx     Esophageal Cancer Neg Hx     Liver Cancer Neg Hx     Liver Disease Neg Hx     Rectal Cancer Neg Hx     Stomach Cancer Neg Hx        Social History  Radha Beard is single, lives in [address], and is retired. Medications:  Current Outpatient Medications   Medication Sig Dispense Refill    amitriptyline (ELAVIL) 25 MG tablet TAKE 1-2 TABLETS BY MOUTH AT BEDTIME 60 tablet 5    fenofibrate (TRICOR) 145 MG tablet 1 tablet with food      irbesartan (AVAPRO) 75 MG tablet       pioglitazone (ACTOS) 30 MG tablet Take 15 mg by mouth daily   0    rosuvastatin (CRESTOR) 10 MG tablet Take 20 mg by mouth daily       Multiple Vitamins-Minerals (THERAPEUTIC MULTIVITAMIN-MINERALS) tablet Take 1 tablet by mouth daily      sertraline (ZOLOFT) 100 MG tablet TAKE 1 TABLET ONCE A DAY  3    clonazePAM (KLONOPIN) 2 MG tablet 2 times daily as needed. 2    lithium 300 MG capsule 600 mg 600mg BID  3    ziprasidone (GEODON) 40 MG capsule 80 mg 2 times daily (with meals)   3    FISH OIL 2,000 mg by Does not apply route 2 times daily        No current facility-administered medications for this visit. Allergies:   Allergies as of 06/07/2022 - Fully Reviewed 06/07/2022   Allergen Reaction Noted    Neosporin [bacitracin-neomycin-polymyxin]  09/18/2012       Review of Systems:  Constitutional: negative for - chills and fever  Eyes:  negative for - visual disturbance and photophobia  HENMT: negative for - headaches and sinus pain  Respiratory: negative for - cough, hemoptysis, and shortness of breath  Cardiovascular: negative for - chest pain and palpitations  Gastrointestinal: negative for - blood in stools, constipation, diarrhea, nausea, and vomiting  Genito-Urinary: negative for - hematuria, urinary frequency, urinary urgency, and urinary retention  Musculoskeletal: negative for - joint pain, joint stiffness, and joint swelling  Hematological and Lymphatic: negative for - bleeding problems, abnormal bruising, and swollen lymph nodes  Endocrine:  negative for - polydipsia and polyphagia  Allergy/Immunology:  negative for - rhinorrhea, sinus congestion, hives  Integument:  negative for - negative for - rash, change in moles, new or changing lesions  Psychological: negative for - anxiety and depression  Neurological: negative for - memory loss, numbness/tingling, and weakness     PHYSICAL EXAMINATION:  Vitals:  /70   Pulse 87   Resp 20   Ht 6' (1.829 m)   Wt 290 lb (131.5 kg)   BMI 39.33 kg/m²   General appearance:  Alert, well developed, well nourished, in no distress  HEENT:  normocephalic, atraumatic, sclera appear normal, no nasal abnormalities, no rhinorrhea, Ears appear normal, oral mucous membranes are moist without erythema, trachea midline, thyroid is normal, no lymphadenopathy or neck mass. Cardiovascular:  Regular rate and rhythm without murmer. No peripheral edema, No cyanosis or clubbing. No carotid bruits. Pulmonary:  Lungs are clear to auscultation. Breathing appears normal, good expansion, normal effort without use of accessory muscles  Musculoskeletal:  Joints are normal.    Integument:  No rash, erythema, or pallor  Psychiatric:  Mood, affect, and behavior appear normal      NEUROLOGIC EXAMINATION:  Mental Status:  alert, oriented to person, place, and time. Speech:  Clear without dysarthria or dysphonia  Language:  Fluent without aphasia  Cranial Nerves:   II Visual fields are full to confrontation   III,IV, VI Extraocular movements are full   VII Facial movements are symmetrical without weakness   VIII Hearing is intact   IX,X Shoulder shrug and head rotation strength are intact   XII No tongue atrophy or fasciculations. Normal tongue protrusion.   No tongue weakness  Motor:  Normal strength in both upper and lower extremities. Normal muscle tone and bulk. Deep tendon reflexes are intact and symmetrical in both upper and lower extremities. Choi's signs are absent bilaterally. There is no ankle clonus on either side. Sensation:  Sensation is intact to light touch, temperature, and vibration in all extremities. Coordination:  Rapid alternating movements are normal in both upper and lower extremities. Finger to nose testing is unimpaired bilaterally. Gait:  Normal station and gait. Pertinent Diagnostic Studies:  No BiPAP download is available. Assessment:       ICD-10-CM    1. Obstructive sleep apnea  G47.33    2. Restless legs syndrome  G25.81    3. Insomnia due to other mental disorder  F51.05     F99    Clinically stable. Plan:   1. Continue BiPAP use during sleep. He may need a new machine  2.  Continue the amitriptyline 50 mg q HS  3. FU in a year    Electronically signed by Darleen Kong MD on 6/7/22

## 2022-08-19 RX ORDER — AMITRIPTYLINE HYDROCHLORIDE 25 MG/1
TABLET, FILM COATED ORAL
Qty: 60 TABLET | Refills: 5 | Status: SHIPPED | OUTPATIENT
Start: 2022-08-19

## 2022-08-19 NOTE — TELEPHONE ENCOUNTER
Requested Prescriptions     Pending Prescriptions Disp Refills    amitriptyline (ELAVIL) 25 MG tablet [Pharmacy Med Name: AMITRIPTYLINE HCL 25 MG TAB] 60 tablet 5     Sig: TAKE 1 TO 2 TABLETS BY MOUTH AT BEDTIME       Last Office Visit: 6/7/2022  Next Office Visit: 6/8/23  Last Medication Refill: 3/7/22 with 5 HILARY

## 2022-08-24 ENCOUNTER — TRANSCRIBE ORDERS (OUTPATIENT)
Dept: ADMINISTRATIVE | Facility: HOSPITAL | Age: 57
End: 2022-08-24

## 2022-08-24 DIAGNOSIS — R07.9 CHEST PAIN, UNSPECIFIED TYPE: Primary | ICD-10-CM

## 2022-08-26 ENCOUNTER — HOSPITAL ENCOUNTER (OUTPATIENT)
Dept: CARDIOLOGY | Facility: HOSPITAL | Age: 57
Discharge: HOME | End: 2022-08-26
Admitting: FAMILY MEDICINE

## 2022-08-26 VITALS
HEIGHT: 72 IN | WEIGHT: 280 LBS | SYSTOLIC BLOOD PRESSURE: 122 MMHG | HEART RATE: 85 BPM | BODY MASS INDEX: 37.93 KG/M2 | DIASTOLIC BLOOD PRESSURE: 69 MMHG

## 2022-08-26 DIAGNOSIS — R07.9 CHEST PAIN, UNSPECIFIED TYPE: ICD-10-CM

## 2022-08-26 LAB
BH CV STRESS BP STAGE 1: NORMAL
BH CV STRESS BP STAGE 2: NORMAL
BH CV STRESS BP STAGE 3: NORMAL
BH CV STRESS BP STAGE 4: NORMAL
BH CV STRESS BP STAGE 5: NORMAL
BH CV STRESS DOB - ATROPINE STAGE 3: 1
BH CV STRESS DOB - ATROPINE STAGE 4: 1
BH CV STRESS DOSE DOBUTAMINE STAGE 1: 10
BH CV STRESS DOSE DOBUTAMINE STAGE 2: 20
BH CV STRESS DOSE DOBUTAMINE STAGE 3: 30
BH CV STRESS DOSE DOBUTAMINE STAGE 4: 40
BH CV STRESS DOSE DOBUTAMINE STAGE 5: 50
BH CV STRESS DURATION MIN STAGE 1: 3
BH CV STRESS DURATION MIN STAGE 2: 3
BH CV STRESS DURATION MIN STAGE 3: 3
BH CV STRESS DURATION MIN STAGE 4: 3
BH CV STRESS DURATION MIN STAGE 5: 1
BH CV STRESS DURATION SEC STAGE 1: 0
BH CV STRESS DURATION SEC STAGE 2: 0
BH CV STRESS DURATION SEC STAGE 3: 0
BH CV STRESS DURATION SEC STAGE 4: 0
BH CV STRESS DURATION SEC STAGE 5: 52
BH CV STRESS HR STAGE 1: 88
BH CV STRESS HR STAGE 2: 107
BH CV STRESS HR STAGE 3: 131
BH CV STRESS HR STAGE 4: 134
BH CV STRESS HR STAGE 5: 134
BH CV STRESS PROTOCOL 1: NORMAL
BH CV STRESS RECOVERY BP: NORMAL MMHG
BH CV STRESS RECOVERY HR: 98 BPM
BH CV STRESS STAGE 1: 1
BH CV STRESS STAGE 2: 2
BH CV STRESS STAGE 3: 3
BH CV STRESS STAGE 4: 4
BH CV STRESS STAGE 5: 5
MAXIMAL PREDICTED HEART RATE: 162 BPM
PERCENT MAX PREDICTED HR: 82.72 %
STRESS BASELINE BP: NORMAL MMHG
STRESS BASELINE HR: 85 BPM
STRESS PERCENT HR: 97 %
STRESS POST EXERCISE DUR MIN: 13 MIN
STRESS POST EXERCISE DUR SEC: 52 SEC
STRESS POST PEAK BP: NORMAL MMHG
STRESS POST PEAK HR: 134 BPM
STRESS TARGET HR: 138 BPM

## 2022-08-26 PROCEDURE — 0 DOBUTAMINE PER 250 MG: Performed by: INTERNAL MEDICINE

## 2022-08-26 PROCEDURE — 93017 CV STRESS TEST TRACING ONLY: CPT

## 2022-08-26 PROCEDURE — 93350 STRESS TTE ONLY: CPT | Performed by: INTERNAL MEDICINE

## 2022-08-26 PROCEDURE — 93018 CV STRESS TEST I&R ONLY: CPT | Performed by: INTERNAL MEDICINE

## 2022-08-26 PROCEDURE — 25010000002 ATROPINE SULFATE: Performed by: INTERNAL MEDICINE

## 2022-08-26 PROCEDURE — 25010000002 PERFLUTREN 6.52 MG/ML SUSPENSION: Performed by: INTERNAL MEDICINE

## 2022-08-26 PROCEDURE — 93352 ADMIN ECG CONTRAST AGENT: CPT | Performed by: INTERNAL MEDICINE

## 2022-08-26 PROCEDURE — 93350 STRESS TTE ONLY: CPT

## 2022-08-26 RX ORDER — DOBUTAMINE HYDROCHLORIDE 100 MG/100ML
10-50 INJECTION INTRAVENOUS CONTINUOUS
Status: DISCONTINUED | OUTPATIENT
Start: 2022-08-26 | End: 2022-08-27 | Stop reason: HOSPADM

## 2022-08-26 RX ADMIN — ATROPINE SULFATE 2 MG: 0.1 INJECTION INTRAVENOUS at 11:57

## 2022-08-26 RX ADMIN — DOBUTAMINE HYDROCHLORIDE 10 MCG/KG/MIN: 100 INJECTION INTRAVENOUS at 11:48

## 2022-08-26 RX ADMIN — PERFLUTREN 8.48 MG: 6.52 INJECTION, SUSPENSION INTRAVENOUS at 11:25

## 2022-11-13 ENCOUNTER — APPOINTMENT (OUTPATIENT)
Dept: GENERAL RADIOLOGY | Facility: HOSPITAL | Age: 57
End: 2022-11-13

## 2022-11-13 ENCOUNTER — HOSPITAL ENCOUNTER (EMERGENCY)
Facility: HOSPITAL | Age: 57
Discharge: HOME | End: 2022-11-13
Attending: EMERGENCY MEDICINE | Admitting: EMERGENCY MEDICINE

## 2022-11-13 VITALS
DIASTOLIC BLOOD PRESSURE: 89 MMHG | HEIGHT: 72 IN | WEIGHT: 290 LBS | TEMPERATURE: 98.2 F | RESPIRATION RATE: 18 BRPM | BODY MASS INDEX: 39.28 KG/M2 | SYSTOLIC BLOOD PRESSURE: 147 MMHG | HEART RATE: 77 BPM | OXYGEN SATURATION: 97 %

## 2022-11-13 DIAGNOSIS — K59.00 CONSTIPATION, UNSPECIFIED CONSTIPATION TYPE: Primary | ICD-10-CM

## 2022-11-13 PROCEDURE — 74018 RADEX ABDOMEN 1 VIEW: CPT

## 2022-11-13 PROCEDURE — 99283 EMERGENCY DEPT VISIT LOW MDM: CPT

## 2022-11-13 RX ORDER — BISACODYL 5 MG/1
10 TABLET, DELAYED RELEASE ORAL ONCE
Status: COMPLETED | OUTPATIENT
Start: 2022-11-13 | End: 2022-11-13

## 2022-11-13 RX ADMIN — BISACODYL 10 MG: 5 TABLET ORAL at 06:28

## 2022-11-13 NOTE — ED PROVIDER NOTES
Subjective   History of Present Illness  Patient complains of what he calls is a bowel impaction because of stool.  He said he not been able to have good bowel movements as he has been on the amitriptyline.  He has been taking some Metamucil which seems to help some but not enough.  He is afraid he may have taken too much because he read that laxatives can make your bowel loses control after a time he did have a bowel movement about 2 days ago that was formed but somewhat hard.  He has not had one since then.  Denies any nausea or vomiting or abdominal pain.  He says it does hurt around his rectum sometimes when he sits    History provided by:  Patient   used: No    Constipation  Severity:  Moderate  Time since last bowel movement:  2 days  Timing:  Constant  Progression:  Worsening  Chronicity:  New  Context: medication    Context: not dehydration, not dietary changes, not narcotics and not stress    Stool description:  Formed and hard  Relieved by:  Nothing  Worsened by:  Nothing  Ineffective treatments:  None tried  Associated symptoms: no abdominal pain, no anorexia, no back pain, no diarrhea, no dysuria, no fever, no flatus, no hematochezia, no nausea, no urinary retention and no vomiting    Risk factors: no change in medication, no hx of abdominal surgery, no obesity, no recent antibiotic use, no recent illness, no recent surgery and no recent travel        Review of Systems   Constitutional: Negative.  Negative for fever.   HENT: Negative.    Respiratory: Negative.    Cardiovascular: Negative.    Gastrointestinal: Positive for constipation. Negative for abdominal pain, anorexia, diarrhea, flatus, hematochezia, nausea and vomiting.   Genitourinary: Negative.  Negative for dysuria.   Musculoskeletal: Negative.  Negative for back pain.   Skin: Negative.    Neurological: Negative.    Psychiatric/Behavioral: Negative.    All other systems reviewed and are negative.      Past Medical History:    Diagnosis Date   • Alcohol abuse    • Anxiety    • Bipolar 1 disorder (HCC)    • Chronic kidney disease, stage 3 (HCC)    • Constipation    • Family history of colonic polyps    • History of adenomatous polyp of colon    • Hypercholesterolemia    • Hypertension    • Manic depression (HCC)    • ALISHA (obstructive sleep apnea)    • Type 2 diabetes mellitus (HCC)        Allergies   Allergen Reactions   • Bacitracin Rash   • Bacitracin-Neomycin-Polymyxin Rash       Past Surgical History:   Procedure Laterality Date   • COLONOSCOPY  11/09/2016    Dr. Snyder-[facility] tubular adenomatous polyp was seen in the cecum; [facility] tubular adenomatous polyp in the descending colon; The patient had a very redundant colon; External hemorrhoids; Repeat 5 years   • COLONOSCOPY N/A 4/22/2021    Procedure: COLONOSCOPY WITH ANESTHESIA;  Surgeon: Albertina Gillette MD;  Location: [facility] ENDOSCOPY;  Service: Gastroenterology;  Laterality: N/A;  pre op: hx polyps  post op:normal  PCP: David Tatum MD   • EYE SURGERY  12/2018       Family History   Problem Relation Age of Onset   • Colon polyps Mother         Unknown age    • Lung cancer Mother    • Lung cancer Father    • Colon cancer Neg Hx    • Esophageal cancer Neg Hx    • Liver cancer Neg Hx    • Liver disease Neg Hx    • Rectal cancer Neg Hx    • Stomach cancer Neg Hx        Social History     Socioeconomic History   • Marital status: Single   Tobacco Use   • Smoking status: Never   • Smokeless tobacco: Current     Types: Chew   Vaping Use   • Vaping Use: Never used   Substance and Sexual Activity   • Alcohol use: Not Currently   • Drug use: Never   • Sexual activity: Defer       Prior to Admission medications    Medication Sig Start Date End Date Taking? Authorizing Provider   clonazePAM (KlonoPIN) 2 MG tablet Take 2 mg by mouth 2 (two) times a day. 3/18/21  Yes Trenton Peoples MD   Dapagliflozin Propanediol (FARXIGA PO) Take  by mouth.   Yes Trenton Peoples MD    fenofibrate (TRICOR) 145 MG tablet Take 1 tablet by mouth Daily. 2/13/21  Yes Trenton Peoples MD   irbesartan (AVAPRO) 75 MG tablet Take 1 tablet by mouth Daily. 2/1/21  Yes Trenton Peoples MD   lithium carbonate 300 MG capsule Take 1 capsule by mouth Every 8 (Eight) Hours.   Yes Trenton Peoples MD   melatonin 5 MG tablet tablet Take 5 mg by mouth At Night As Needed.   Yes Trenton Peoples MD   multivitamin with minerals tablet tablet Take 1 tablet by mouth Daily.   Yes Trenton Peoples MD   pioglitazone (ACTOS) 30 MG tablet Take 1 tablet by mouth Daily.   Yes Trenton Peoples MD   psyllium (METAMUCIL) 58.6 % packet Take 1 packet by mouth Daily.   Yes Trenton Peoples MD   rosuvastatin (CRESTOR) 10 MG tablet Take 20 mg by mouth Daily.   Yes Trenton Peoples MD   sertraline (ZOLOFT) 100 MG tablet Take 1 tablet by mouth Daily.   Yes Trenton Peoples MD   vitamin D (ERGOCALCIFEROL) 1.25 MG (80619 UT) capsule capsule Take 1 capsule by mouth 1 (One) Time Per Week. 2/13/21  Yes Trenton Peoples MD   ziprasidone (GEODON) 40 MG capsule Take 1 capsule by mouth Every 12 (Twelve) Hours.   Yes Trenton Peoples MD   Omega-3 Fatty Acids (fish oil) 1000 MG capsule capsule Take 2,000 mg by mouth Daily.    Trenton Peoples MD       Medications   bisacodyl (DULCOLAX) EC tablet 10 mg (has no administration in time range)       Vitals:    11/13/22 0524   BP: 153/76   Pulse: 78   Resp: 18   Temp: 98 °F (36.7 °C)   SpO2: 97%         Objective   Physical Exam  Vitals and nursing note reviewed.   Constitutional:       Appearance: Normal appearance.   HENT:      Head: Normocephalic and atraumatic.   Eyes:      Pupils: Pupils are equal, round, and reactive to light.   Cardiovascular:      Rate and Rhythm: Normal rate and regular rhythm.   Pulmonary:      Effort: Pulmonary effort is normal.      Breath sounds: Normal breath sounds.   Abdominal:      Palpations: Abdomen is soft.       Tenderness: There is no abdominal tenderness.      Comments: Patient is moderately obese.   Musculoskeletal:         General: Normal range of motion.   Neurological:      General: No focal deficit present.      Mental Status: He is alert and oriented to person, place, and time.   Psychiatric:         Mood and Affect: Mood normal.         Behavior: Behavior normal.         Procedures         Lab Results (last 24 hours)     ** No results found for the last 24 hours. **          XR Abdomen KUB   ED Interpretation   constipation          ED Course  ED Course as of 11/13/22 0603   Sun Nov 13, 2022   0602 I told the patient his x-ray does not reveal any signs of bowel blockage.  I will see a stool ball consistent with a fecal impaction.  I think he is just constipated and will give him something to help ease that and he can continue to use the Metamucil.  He is discharged in stable condition. [TR]      ED Course User Index  [TR] Ant Crandall Jr., MD          MDM  Number of Diagnoses or Management Options  Constipation, unspecified constipation type: new and requires workup     Amount and/or Complexity of Data Reviewed  Tests in the radiology section of CPT®: ordered and reviewed  Independent visualization of images, tracings, or specimens: yes    Risk of Complications, Morbidity, and/or Mortality  Presenting problems: moderate  Diagnostic procedures: moderate  Management options: moderate    Patient Progress  Patient progress: stable      Final diagnoses:   Constipation, unspecified constipation type          Ant Crandall Jr., MD  11/13/22 0603

## 2022-11-14 ENCOUNTER — TELEPHONE (OUTPATIENT)
Dept: NEUROLOGY | Age: 57
End: 2022-11-14

## 2022-11-14 NOTE — TELEPHONE ENCOUNTER
Patient called stating that his medication amitriptyline is making him constipated and is wondering if he can be prescribed something else.

## 2022-11-17 NOTE — TELEPHONE ENCOUNTER
Patient came by office to report he is having severe constipation due to amitriptyline 25mg 1-2 PO QHS. He has kidney disease and can't take miralax. Wlould like to change to something else for sleep.

## 2023-02-14 ENCOUNTER — OFFICE VISIT (OUTPATIENT)
Dept: GASTROENTEROLOGY | Facility: CLINIC | Age: 58
End: 2023-02-14
Payer: MEDICAID

## 2023-02-14 VITALS
SYSTOLIC BLOOD PRESSURE: 110 MMHG | OXYGEN SATURATION: 95 % | BODY MASS INDEX: 37.65 KG/M2 | TEMPERATURE: 97.5 F | HEART RATE: 88 BPM | WEIGHT: 278 LBS | DIASTOLIC BLOOD PRESSURE: 72 MMHG | HEIGHT: 72 IN

## 2023-02-14 DIAGNOSIS — Z86.010 HISTORY OF ADENOMATOUS POLYP OF COLON: ICD-10-CM

## 2023-02-14 DIAGNOSIS — R07.9 CHEST PAIN, UNSPECIFIED TYPE: Primary | ICD-10-CM

## 2023-02-14 PROCEDURE — 99214 OFFICE O/P EST MOD 30 MIN: CPT | Performed by: NURSE PRACTITIONER

## 2023-02-14 RX ORDER — SUCRALFATE 1 G/10ML
10 SUSPENSION ORAL 2 TIMES DAILY
COMMUNITY
Start: 2023-01-12 | End: 2023-03-06 | Stop reason: HOSPADM

## 2023-02-14 RX ORDER — OMEPRAZOLE 40 MG/1
1 CAPSULE, DELAYED RELEASE ORAL DAILY
COMMUNITY
Start: 2022-11-28

## 2023-02-14 NOTE — PROGRESS NOTES
"Primary Physician: David Tatum MD    Chief Complaint   Patient presents with   • Chest Pain     Pt c/o issues with chest pain \"for years\" but states it has been happening more often lately-states it seems to happen \"randomly\"-will take an aspirin and that makes it go away; Pt had negative cardiac workup about a month ago; Pt was given Omeprazole daily and Carafate liquid BID by PCP-states it definitely helped at first as he didn't have any issues for about 2 weeks but has since had several episodes of pain        Subjective     Catalino Sheehan is a 57 y.o. male.    HPI   GERD/Chest Pain  Pt has had pain in his epigastric region that radiates to the right chest. He doesn't feel like he is having heartburn. As a rule no trouble swallowing foods or liquids.  No family hx esophageal diseases or cancer.  No previous endoscopic evaluation.  Pt trial of Omeprazole which did help some but did not relieve all chest pain.  Carafate BID was added which again helped but he has had 3 episodes of chest pain.    The pain is not associated with eating.  The pain has awaken him at night.    No signs of melena.    Pt reports having negative EKG as well as Echo/Stress Evaluation.  Pt denies any history of heart disease.    Pt denies any NSAIDS only will use Tylenol.    Pt does a gallbladder.      Patient up-to-date on colonoscopy.  Last seen April 22, 2021 at which time entire examined colon was normal.  5-year recall given.    Past Medical History:   Diagnosis Date   • Alcohol abuse    • Anxiety    • Bipolar 1 disorder (HCC)    • Chronic kidney disease, stage 3 (HCC)    • Constipation    • Family history of colonic polyps    • History of adenomatous polyp of colon    • Hypercholesterolemia    • Hypertension    • Manic depression (HCC)    • ALISHA (obstructive sleep apnea)    • Type 2 diabetes mellitus (HCC)        Past Surgical History:   Procedure Laterality Date   • COLONOSCOPY  11/09/2016    Dr. Snyder-Diminuitive tubular " adenomatous polyp was seen in the cecum; Diminuitive tubular adenomatous polyp in the descending colon; The patient had a very redundant colon; External hemorrhoids; Repeat 5 years   • COLONOSCOPY N/A 04/22/2021    The entire examined colon is normal on direct and retroflexion views; No specimens collected; Repeat 5 years   • EYE SURGERY  12/2018        Current Outpatient Medications:   •  Carafate 1 GM/10ML suspension, Take 10 mL by mouth 2 (Two) Times a Day., Disp: , Rfl:   •  clonazePAM (KlonoPIN) 2 MG tablet, Take 2 mg by mouth 2 (two) times a day., Disp: , Rfl:   •  dapagliflozin Propanediol 10 MG tablet, Take 1 tablet by mouth Daily., Disp: , Rfl:   •  fenofibrate (TRICOR) 145 MG tablet, Take 1 tablet by mouth Daily., Disp: , Rfl:   •  irbesartan (AVAPRO) 75 MG tablet, Take 1 tablet by mouth Daily., Disp: , Rfl:   •  lithium 600 MG capsule, Take 1 capsule by mouth 2 (Two) Times a Day With Meals., Disp: , Rfl:   •  melatonin 5 MG tablet tablet, Take 5 mg by mouth At Night As Needed., Disp: , Rfl:   •  multivitamin with minerals tablet tablet, Take 1 tablet by mouth Daily., Disp: , Rfl:   •  Omega-3 Fatty Acids (fish oil) 1000 MG capsule capsule, Take 2,000 mg by mouth Daily., Disp: , Rfl:   •  omeprazole (priLOSEC) 40 MG capsule, Take 1 capsule by mouth Daily., Disp: , Rfl:   •  pioglitazone (ACTOS) 30 MG tablet, Take 1 tablet by mouth Daily., Disp: , Rfl:   •  psyllium (METAMUCIL) 58.6 % packet, Take 1 packet by mouth Daily., Disp: , Rfl:   •  rosuvastatin (CRESTOR) 10 MG tablet, Take 20 mg by mouth Daily., Disp: , Rfl:   •  sertraline (ZOLOFT) 100 MG tablet, Take 1 tablet by mouth Daily., Disp: , Rfl:   •  vitamin D (ERGOCALCIFEROL) 1.25 MG (95811 UT) capsule capsule, Take 1 capsule by mouth 1 (One) Time Per Week., Disp: , Rfl:   •  ziprasidone (GEODON) 40 MG capsule, Take 1 capsule by mouth Every 12 (Twelve) Hours., Disp: , Rfl:     Allergies   Allergen Reactions   • Bacitracin Rash   •  "Bacitracin-Neomycin-Polymyxin Rash       Social History     Socioeconomic History   • Marital status: Single   Tobacco Use   • Smoking status: Never   • Smokeless tobacco: Current     Types: Chew   • Tobacco comments:     snuff for 45 yrs   Vaping Use   • Vaping Use: Never used   Substance and Sexual Activity   • Alcohol use: Not Currently   • Drug use: Never   • Sexual activity: Not Currently       Family History   Problem Relation Age of Onset   • Colon polyps Mother         Unknown age    • Lung cancer Mother    • Lung cancer Father    • Colon cancer Neg Hx    • Esophageal cancer Neg Hx    • Liver cancer Neg Hx    • Liver disease Neg Hx    • Rectal cancer Neg Hx    • Stomach cancer Neg Hx        Review of Systems    Objective     /72 (BP Location: Left arm, Patient Position: Sitting, Cuff Size: Adult)   Pulse 88   Temp 97.5 °F (36.4 °C) (Infrared)   Ht 182.9 cm (72\")   Wt 126 kg (278 lb)   SpO2 95%   BMI 37.70 kg/m²     Physical Exam        IMPRESSION/PLAN:    Assessment & Plan      Problem List Items Addressed This Visit    None    Endoscopy per Dr Gillette  Continue Omeprazole and Carafate for now (Hold Carafate 24 hours prior to procedure)    ..Pt is instructed to avoid caffeine, chocolate, peppermint and nicotine.  They are to avoid eating within 2-3 hours prior to bedtime.    If upper endoscopy unremarkable consider gallbladder evaluation.    Recall colonoscopy April 2027        ..The risks, benefits, and alternatives of endoscopy were reviewed with the patient today.  Risks including perforation, with or without dilation, possibly requiring surgery.  Additional risks include risk of bleeding.  There is also the risk of a drug reaction or problems with anesthesia.  This will be discussed with the further by the anesthesia team on the day of the procedure. The benefits include the diagnosis and management of disease of the upper digestive tract.  Alternatives to endoscopy include upper GI series, " laboratory testing, radiographic evaluation, or no intervention.  The patient verbalizes understanding and agrees.    In accordance with requirements under the Affordable Care Act, [facility] has provided pricing for all hospital services and items on each of its websites. However, a patient's actual cost may differ based on the services the patient receives to meet individual healthcare needs and based on the benefits provided under the patient’s insurance coverage.        Gabby Buck, APRN  02/14/23  14:08 CST    Part of this note may be an electronic transcription/translation of spoken language to printed text.

## 2023-03-03 ENCOUNTER — TRANSCRIBE ORDERS (OUTPATIENT)
Dept: ADMINISTRATIVE | Facility: HOSPITAL | Age: 58
End: 2023-03-03
Payer: MEDICAID

## 2023-03-03 DIAGNOSIS — R07.9 CHEST PAIN, UNSPECIFIED TYPE: Primary | ICD-10-CM

## 2023-03-06 ENCOUNTER — ANESTHESIA EVENT (OUTPATIENT)
Dept: GASTROENTEROLOGY | Facility: HOSPITAL | Age: 58
End: 2023-03-06
Payer: MEDICAID

## 2023-03-06 ENCOUNTER — HOSPITAL ENCOUNTER (OUTPATIENT)
Facility: HOSPITAL | Age: 58
Setting detail: HOSPITAL OUTPATIENT SURGERY
Discharge: HOME | End: 2023-03-06
Attending: INTERNAL MEDICINE | Admitting: INTERNAL MEDICINE
Payer: MEDICAID

## 2023-03-06 ENCOUNTER — ANESTHESIA (OUTPATIENT)
Dept: GASTROENTEROLOGY | Facility: HOSPITAL | Age: 58
End: 2023-03-06
Payer: MEDICAID

## 2023-03-06 VITALS
HEIGHT: 72 IN | OXYGEN SATURATION: 97 % | DIASTOLIC BLOOD PRESSURE: 106 MMHG | TEMPERATURE: 97.1 F | SYSTOLIC BLOOD PRESSURE: 122 MMHG | WEIGHT: 284 LBS | RESPIRATION RATE: 20 BRPM | HEART RATE: 63 BPM | BODY MASS INDEX: 38.47 KG/M2

## 2023-03-06 DIAGNOSIS — R07.9 CHEST PAIN, UNSPECIFIED TYPE: ICD-10-CM

## 2023-03-06 LAB — GLUCOSE BLDC GLUCOMTR-MCNC: 75 MG/DL (ref 70–130)

## 2023-03-06 PROCEDURE — 82962 GLUCOSE BLOOD TEST: CPT

## 2023-03-06 PROCEDURE — 25010000002 PROPOFOL 10 MG/ML EMULSION: Performed by: NURSE ANESTHETIST, CERTIFIED REGISTERED

## 2023-03-06 PROCEDURE — 88305 TISSUE EXAM BY PATHOLOGIST: CPT | Performed by: INTERNAL MEDICINE

## 2023-03-06 PROCEDURE — 43239 EGD BIOPSY SINGLE/MULTIPLE: CPT | Performed by: INTERNAL MEDICINE

## 2023-03-06 RX ORDER — SODIUM CHLORIDE 0.9 % (FLUSH) 0.9 %
10 SYRINGE (ML) INJECTION AS NEEDED
Status: DISCONTINUED | OUTPATIENT
Start: 2023-03-06 | End: 2023-03-06 | Stop reason: HOSPADM

## 2023-03-06 RX ORDER — PROPOFOL 10 MG/ML
VIAL (ML) INTRAVENOUS AS NEEDED
Status: DISCONTINUED | OUTPATIENT
Start: 2023-03-06 | End: 2023-03-06 | Stop reason: SURG

## 2023-03-06 RX ORDER — LIDOCAINE HYDROCHLORIDE 10 MG/ML
0.5 INJECTION, SOLUTION EPIDURAL; INFILTRATION; INTRACAUDAL; PERINEURAL ONCE AS NEEDED
Status: DISCONTINUED | OUTPATIENT
Start: 2023-03-06 | End: 2023-03-06 | Stop reason: HOSPADM

## 2023-03-06 RX ORDER — SODIUM CHLORIDE 9 MG/ML
500 INJECTION, SOLUTION INTRAVENOUS CONTINUOUS PRN
Status: DISCONTINUED | OUTPATIENT
Start: 2023-03-06 | End: 2023-03-06 | Stop reason: HOSPADM

## 2023-03-06 RX ORDER — SODIUM CHLORIDE 9 MG/ML
1000 INJECTION, SOLUTION INTRAVENOUS CONTINUOUS
Status: DISCONTINUED | OUTPATIENT
Start: 2023-03-06 | End: 2023-03-06 | Stop reason: HOSPADM

## 2023-03-06 RX ORDER — LIDOCAINE HYDROCHLORIDE 20 MG/ML
INJECTION, SOLUTION EPIDURAL; INFILTRATION; INTRACAUDAL; PERINEURAL AS NEEDED
Status: DISCONTINUED | OUTPATIENT
Start: 2023-03-06 | End: 2023-03-06 | Stop reason: SURG

## 2023-03-06 RX ADMIN — PROPOFOL INJECTABLE EMULSION 100 MG: 10 INJECTION, EMULSION INTRAVENOUS at 07:53

## 2023-03-06 RX ADMIN — SODIUM CHLORIDE 500 ML: 9 INJECTION, SOLUTION INTRAVENOUS at 07:45

## 2023-03-06 RX ADMIN — LIDOCAINE HYDROCHLORIDE 50 MG: 20 INJECTION, SOLUTION EPIDURAL; INFILTRATION; INTRACAUDAL; PERINEURAL at 07:53

## 2023-03-06 RX ADMIN — PROPOFOL INJECTABLE EMULSION 50 MG: 10 INJECTION, EMULSION INTRAVENOUS at 07:56

## 2023-03-06 NOTE — ANESTHESIA POSTPROCEDURE EVALUATION
Patient: Catalino Sheehan    Procedure Summary     Date: 03/06/23 Room / Location: [facility] ENDOSCOPY 6 / [facility]    Anesthesia Start: 0751 Anesthesia Stop: 0805    Procedure: ESOPHAGOGASTRODUODENOSCOPY WITH ANESTHESIA Diagnosis:       Chest pain, unspecified type      (Chest pain, unspecified type [R07.9])    Surgeons: Albertina Gillette MD Provider: Angel Sandoval CRNA    Anesthesia Type: MAC ASA Status: 2          Anesthesia Type: MAC    Vitals  Vitals Value Taken Time   /106 03/06/23 0821   Temp     Pulse 62 03/06/23 0821   Resp 20 03/06/23 0820   SpO2 99 % 03/06/23 0821   Vitals shown include unvalidated device data.        Post Anesthesia Care and Evaluation    Patient location during evaluation: PHASE II  Patient participation: complete - patient participated  Level of consciousness: awake and sleepy but conscious  Pain score: 0  Pain management: adequate    Airway patency: patent  Anesthetic complications: No anesthetic complications    Cardiovascular status: acceptable  Respiratory status: acceptable  Hydration status: acceptable

## 2023-03-06 NOTE — ANESTHESIA PREPROCEDURE EVALUATION
Anesthesia Evaluation     Patient summary reviewed   no history of anesthetic complications:  NPO Solid Status: > 8 hours             Airway   Mallampati: II  Dental - normal exam     Pulmonary    (+) sleep apnea on CPAP,   Cardiovascular   Exercise tolerance: good (4-7 METS)    (+) hypertension, hyperlipidemia,     ROS comment: Echo:  ? Low risk for ischemia.  ? All left ventricular wall segments demonstrate appropriate increase in contractility with dobutamine infusion.      Neuro/Psych  (+) psychiatric history Bipolar,    (-) seizures, TIA, CVA  GI/Hepatic/Renal/Endo    (+) obesity,   renal disease CRI, diabetes mellitus,     Musculoskeletal     Abdominal    Substance History      OB/GYN          Other                          Anesthesia Plan    ASA 2     MAC       Anesthetic plan, risks, benefits, and alternatives have been provided, discussed and informed consent has been obtained with: patient.

## 2023-03-07 LAB
CYTO UR: NORMAL
LAB AP CASE REPORT: NORMAL
Lab: NORMAL
PATH REPORT.FINAL DX SPEC: NORMAL
PATH REPORT.GROSS SPEC: NORMAL

## 2023-03-29 ENCOUNTER — TELEPHONE (OUTPATIENT)
Dept: NEUROLOGY | Age: 58
End: 2023-03-29

## 2023-03-29 NOTE — TELEPHONE ENCOUNTER
Pt called and states there was a recall on his c-pap so he took it to [facility]. They informed him since it had been over 5 years since he received the current one, he can receive a new one. Pt states he had a sleep study 3-4 months ago. Pt is asking for prescription to be sent to [facility] please. If he needs an appt first, I can let him know. Last seen in office on 6/7/22. Please advise.

## 2023-03-31 NOTE — TELEPHONE ENCOUNTER
I called pt back to inform him of Radha's response. I asked him where he had the sleep study done at recently and he said at the place we normally send to but it might have been a while instead of recent. I told him we have his last study that was performed in 2021. He states that is correct and he didn't realize it had been that long ago. I informed pt he would need to be seen in office by Daniel Bacon or Dr. Joy Francisco due to insurance requirements in order to receive the c-pap. Pt states understanding. Please call pt and schedule for soonest available appt. Thank you.

## 2023-04-04 NOTE — TELEPHONE ENCOUNTER
Called and spoke with patient to let him know that I have him scheduled an appointment with HEIKE Umana. Patient is aware of the appointment time/date.

## 2023-08-21 RX ORDER — AMITRIPTYLINE HYDROCHLORIDE 25 MG/1
TABLET, FILM COATED ORAL
Qty: 60 TABLET | Refills: 5 | Status: SHIPPED | OUTPATIENT
Start: 2023-08-21

## 2023-08-21 NOTE — TELEPHONE ENCOUNTER
Requested Prescriptions     Pending Prescriptions Disp Refills    amitriptyline (ELAVIL) 25 MG tablet [Pharmacy Med Name: AMITRIPTYLINE HCL 25 MG TAB] 60 tablet 5     Sig: TAKE 1 TO 2 TABLETS BY MOUTH AT BEDTIME       Last Office Visit: 6/8/2023  Next Office Visit: Visit date not found  Last Medication Refill: 8/19/2022 with 5 RF

## 2023-10-12 NOTE — PROGRESS NOTES
[facility] - PODIATRY    Today's Date: 10/20/2023     Patient Name: Catalino Sheehan  MRN: [id number]  CSN: [id number]  PCP: David Tatum MD  Referring Provider: Guillermina Marcus,*    SUBJECTIVE     Chief Complaint   Patient presents with    Establish Care     David Tatum MD 06/20/2023 NEW PATIENT - DIABETIC FOOT PAIN.- pt states that feet have been hurting while walking for past 6 months.     Diabetes     Checked today w/ PCP, 106     HPI: Catalino Sheehan, a 58 y.o.male, comes to clinic as a(n) new patient presenting for diabetic foot exam and complaining of toenail/callus issues. Patient has h/o alcohol abuse, anxiety, bipolar 1, CKD 3, hypercholesterolemia, HTN, depression, ALISHA, DM 2 . Patient is NIDDM with last stated BG level of 106mg/dl.  Denies significant numbness and tingling in his feet.  Denies open wounds or sores.  Relates that his toenails are long, thick, and discolored.  He has difficulty caring for them himself.  Also notes several calluses to his feet.  Admits to frequently walking barefoot.  Also notes chronic discomfort to his lower legs and feet.  Notes that the more he is on his feet, the more they are uncomfortable.  Admits pain at 5/10 level and described as aching, nagging, and dull. Denies previous treatment. Denies any constitutional symptoms. No other pedal complaints at this time.    Past Medical History:   Diagnosis Date    Alcohol abuse     Anxiety     Bipolar 1 disorder     Chronic kidney disease, stage 3     Constipation     Family history of colonic polyps     History of adenomatous polyp of colon     Hypercholesterolemia     Hypertension     Manic depression     ALISHA (obstructive sleep apnea)     Type 2 diabetes mellitus      Past Surgical History:   Procedure Laterality Date    COLONOSCOPY  11/09/2016    Dr. Snyder-Diminuitive tubular adenomatous polyp was seen in the cecum; Diminuitive tubular adenomatous polyp in the descending colon; The patient  had a very redundant colon; External hemorrhoids; Repeat 5 years    COLONOSCOPY N/A 04/22/2021    The entire examined colon is normal on direct and retroflexion views; No specimens collected; Repeat 5 years    ENDOSCOPY N/A 3/6/2023    Procedure: ESOPHAGOGASTRODUODENOSCOPY WITH ANESTHESIA;  Surgeon: Albertina Gillette MD;  Location: [facility] ENDOSCOPY;  Service: Gastroenterology;  Laterality: N/A;  pre: non cardiac chest pain  post: r/o Daivd Cueva MD    EYE SURGERY  12/2018     Family History   Problem Relation Age of Onset    Colon polyps Mother         Unknown age     Lung cancer Mother     Lung cancer Father     Colon cancer Neg Hx     Esophageal cancer Neg Hx     Liver cancer Neg Hx     Liver disease Neg Hx     Rectal cancer Neg Hx     Stomach cancer Neg Hx      Social History     Socioeconomic History    Marital status: Single   Tobacco Use    Smoking status: Never    Smokeless tobacco: Current     Types: Chew    Tobacco comments:     snuff for 45 yrs   Vaping Use    Vaping Use: Never used   Substance and Sexual Activity    Alcohol use: Not Currently    Drug use: Never    Sexual activity: Not Currently     Allergies   Allergen Reactions    Bacitracin Rash    Bacitracin-Neomycin-Polymyxin Rash     Current Outpatient Medications   Medication Sig Dispense Refill    amitriptyline (ELAVIL) 25 MG tablet Take 1-2 tablets by mouth every night at bedtime.      clonazePAM (KlonoPIN) 2 MG tablet Take 1 tablet by mouth 2 (two) times a day.      dapagliflozin Propanediol 10 MG tablet Take 10 mg by mouth Daily.      fenofibrate (TRICOR) 145 MG tablet Take 1 tablet by mouth Daily.      irbesartan (AVAPRO) 75 MG tablet Take 1 tablet by mouth Daily.      lithium 600 MG capsule Take 1 capsule by mouth 2 (Two) Times a Day With Meals.      melatonin 5 MG tablet tablet Take 1 tablet by mouth At Night As Needed.      multivitamin with minerals tablet tablet Take 1 tablet by mouth Daily.      Omega-3 Fatty Acids (fish  oil) 1000 MG capsule capsule Take 2 capsules by mouth Daily.      omeprazole (priLOSEC) 40 MG capsule Take 1 capsule by mouth Daily.      pioglitazone (ACTOS) 30 MG tablet Take 1 tablet by mouth Daily.      psyllium (METAMUCIL) 58.6 % packet Take 1 packet by mouth Daily.      rosuvastatin (CRESTOR) 10 MG tablet Take 2 tablets by mouth Daily.      sertraline (ZOLOFT) 100 MG tablet Take 1 tablet by mouth Daily.      vitamin D (ERGOCALCIFEROL) 1.25 MG (96907 UT) capsule capsule Take 1 capsule by mouth 1 (One) Time Per Week.      ziprasidone (GEODON) 40 MG capsule Take 1 capsule by mouth Every 12 (Twelve) Hours.       No current facility-administered medications for this visit.     Review of Systems   Constitutional:  Negative for chills and fever.   HENT:  Negative for congestion.    Respiratory:  Negative for shortness of breath.    Cardiovascular:  Negative for chest pain and leg swelling.   Gastrointestinal:  Negative for constipation, diarrhea, nausea and vomiting.   Musculoskeletal:         Foot pain   Skin:  Negative for wound.   Neurological:  Positive for weakness. Negative for numbness.   Psychiatric/Behavioral:  Negative for agitation.        OBJECTIVE     Vitals:    10/20/23 1425   BP: 120/78   Pulse: 76   SpO2: 98%       PHYSICAL EXAM  GEN:   Accompanied by none.     Foot/Ankle Exam    GENERAL  Diabetic foot exam performed    Appearance:  appears stated age and obese  Orientation:  AAOx3  Affect:  appropriate  Assistance:  independent  Right shoe gear: diabetic shoe  Left shoe gear: diabetic shoe    VASCULAR     Right Foot Vascularity   Dorsalis pedis:  2+  Posterior tibial:  2+  Skin temperature:  warm  Edema grading:  Trace  CFT:  3  Pedal hair growth:  Present  Varicosities:  mild varicosities     Left Foot Vascularity   Dorsalis pedis:  2+  Posterior tibial:  2+  Skin temperature:  warm  Edema grading:  Trace  CFT:  3  Pedal hair growth:  Present  Varicosities:  mild varicosities     NEUROLOGIC      Right Foot Neurologic   Light touch sensation: diminished  Vibratory sensation: diminished  Hot/Cold sensation: normal  Protective Sensation using [place] Monofilament:   Sites intact: 10  Sites tested: 10     Left Foot Neurologic   Light touch sensation: diminished  Vibratory sensation: diminished  Hot/Cold sensation:  normal  Protective Sensation using [place] Monofilament:   Sites intact: 10  Sites tested: 10    MUSCULOSKELETAL     Right Foot Musculoskeletal   Ecchymosis:  none  Tenderness:  callous right foot and toenail problem    Arch:  Normal  Hammertoe:  Second toe, third toe, fourth toe and fifth toe     Left Foot Musculoskeletal   Ecchymosis:  none  Tenderness:  callous left foot and toenail problem  Arch:  Normal  Hamm[place]oe:  Second toe, third toe, fourth toe and fifth toe    MUSCLE STRENGTH     Right Foot Muscle Strength   Foot dorsiflexion:  4+  Foot plantar flexion:  4+  Foot inversion:  4-  Foot eversion:  4-     Left Foot Muscle Strength   Foot dorsiflexion:  4+  Foot plantar flexion:  4+  Foot inversion:  4-  Foot eversion:  4-    RANGE OF MOTION     Right Foot Range of Motion   Foot and ankle ROM within normal limits       Left Foot Range of Motion   Foot and ankle ROM within normal limits      DERMATOLOGIC      Right Foot Dermatologic   Skin  Positive for corn, dryness and skin changes.   Nails  1.  Positive for elongated, abnormal thickness and subungual debris.  2.  Positive for elongated, abnormal thickness and subungual debris.  3.  Positive for elongated, abnormal thickness and subungual debris.  4.  Positive for elongated, abnormal thickness and subungual debris.  5.  Positive for elongated, abnormal thickness and subungual debris.     Left Foot Dermatologic   Skin  Positive for corn, dryness and skin changes.   Nails  1.  Positive for elongated, abnormal thickness and subungual debris.  2.  Positive for elongated, abnormal thickness and subungual debris.  3.  Positive for  elongated, abnormal thickness and subungual debris.  4.  Positive for elongated, abnormally thick and subungual debris.  5.  Positive for elongated, abnormally thick and subungual debris.    Image:       RADIOLOGY/NUCLEAR:  No results found.    LABORATORY/CULTURE RESULTS:      PATHOLOGY RESULTS:       ASSESSMENT/PLAN     Diagnoses and all orders for this visit:    1. Thickened nails (Primary)    2. Stage 3 chronic kidney disease, unspecified whether stage 3a or 3b CKD    3. Controlled type 2 diabetes mellitus with stage 3 chronic kidney disease, without long-term current use of insulin    4. Foot pain, bilateral    5. Foot callus    6. Encounter for diabetic foot exam    7. Weakness of both lower extremities  -     Ambulatory Referral to Physical Therapy Evaluate and treat; Strengthening, Stretching, ROM      Comprehensive lower extremity examination and evaluation was performed.  Discussed findings and treatment plan including risks, benefits, and treatment options with patient in detail. Patient agreed with treatment plan.  DFE performed  After verbal consent obtained, nail(s) x10 debrided of length and thickness with nail nipper without incidence  After verbal consent obtained, calluses x4 pared utilizing dermal curette and/or scalpel without incidence  Patient may maintain nails and calluses at home utilizing emery board or pumice stone between visits as needed  Reviewed at home diabetic foot care including daily foot checks   Moisturize and scrub feet daily.  Referral to physical therapy to assist with lower extremity weakness which I feel is a significant component to his underlying foot and lower leg pain.  An After Visit Summary was printed and given to the patient at discharge, including (if requested) any available informative/educational handouts regarding diagnosis, treatment, or medications. All questions were answered to patient/family satisfaction. Should symptoms fail to improve or worsen they agree  to call or return to clinic or to go to the Emergency Department. Discussed the importance of following up with any needed screening tests/labs/specialist appointments and any requested follow-up recommended by me today. Importance of maintaining follow-up discussed and patient accepts that missed appointments can delay diagnosis and potentially lead to worsening of conditions.  Return in about 3 months (around 1/20/2024) for Follow-up with Podiatry Provider, Schedule [facility]., or sooner if acute issues arise.    Lab Frequency Next Occurrence   Follow Anesthesia Guidelines / Protocol Once 03/29/2021   Obtain Informed Consent Once 04/03/2021   Stress test with myocardial perfusion one day Once 03/08/2023   Diet: Once 03/06/2023       This document has been electronically signed by Migue Alonzo DPM on October 20, 2023 14:53 CDT

## 2023-10-19 ENCOUNTER — TELEPHONE (OUTPATIENT)
Dept: PODIATRY | Facility: CLINIC | Age: 58
End: 2023-10-19
Payer: MEDICAID

## 2023-10-20 ENCOUNTER — OFFICE VISIT (OUTPATIENT)
Dept: PODIATRY | Facility: CLINIC | Age: 58
End: 2023-10-20
Payer: MEDICAID

## 2023-10-20 VITALS
BODY MASS INDEX: 39.28 KG/M2 | WEIGHT: 290 LBS | SYSTOLIC BLOOD PRESSURE: 120 MMHG | DIASTOLIC BLOOD PRESSURE: 78 MMHG | OXYGEN SATURATION: 98 % | HEART RATE: 76 BPM | HEIGHT: 72 IN

## 2023-10-20 DIAGNOSIS — N18.30 STAGE 3 CHRONIC KIDNEY DISEASE, UNSPECIFIED WHETHER STAGE 3A OR 3B CKD: ICD-10-CM

## 2023-10-20 DIAGNOSIS — L84 FOOT CALLUS: ICD-10-CM

## 2023-10-20 DIAGNOSIS — E11.22 CONTROLLED TYPE 2 DIABETES MELLITUS WITH STAGE 3 CHRONIC KIDNEY DISEASE, WITHOUT LONG-TERM CURRENT USE OF INSULIN: ICD-10-CM

## 2023-10-20 DIAGNOSIS — L60.2 THICKENED NAILS: Primary | ICD-10-CM

## 2023-10-20 DIAGNOSIS — N18.30 CONTROLLED TYPE 2 DIABETES MELLITUS WITH STAGE 3 CHRONIC KIDNEY DISEASE, WITHOUT LONG-TERM CURRENT USE OF INSULIN: ICD-10-CM

## 2023-10-20 DIAGNOSIS — E11.9 ENCOUNTER FOR DIABETIC FOOT EXAM: ICD-10-CM

## 2023-10-20 DIAGNOSIS — M79.672 FOOT PAIN, BILATERAL: ICD-10-CM

## 2023-10-20 DIAGNOSIS — M79.671 FOOT PAIN, BILATERAL: ICD-10-CM

## 2023-10-20 DIAGNOSIS — R29.898 WEAKNESS OF BOTH LOWER EXTREMITIES: ICD-10-CM

## 2023-10-20 PROBLEM — G25.81 RESTLESS LEGS SYNDROME: Status: ACTIVE | Noted: 2021-10-13

## 2023-10-20 PROBLEM — E78.5 DYSLIPIDEMIA: Status: ACTIVE | Noted: 2022-02-16

## 2023-10-20 PROBLEM — F41.9 ANXIETY DISORDER: Status: ACTIVE | Noted: 2022-02-16

## 2023-10-20 PROBLEM — H25.13 AGE-RELATED NUCLEAR CATARACT OF BOTH EYES: Status: ACTIVE | Noted: 2018-06-06

## 2023-10-20 PROBLEM — H02.402 ACQUIRED INVOLUTIONAL PTOSIS OF LEFT EYELID: Status: ACTIVE | Noted: 2018-10-31

## 2023-10-20 PROBLEM — E66.01 MORBID OBESITY: Status: ACTIVE | Noted: 2022-02-16

## 2023-10-20 PROBLEM — F41.0 PANIC ATTACK: Status: ACTIVE | Noted: 2022-02-16

## 2023-10-20 PROBLEM — F10.10 ALCOHOL ABUSE: Status: ACTIVE | Noted: 2022-02-16

## 2023-10-20 PROBLEM — Z99.89 BIPAP (BIPHASIC POSITIVE AIRWAY PRESSURE) DEPENDENCE: Status: ACTIVE | Noted: 2022-02-16

## 2023-10-20 RX ORDER — AMITRIPTYLINE HYDROCHLORIDE 25 MG/1
25-50 TABLET, FILM COATED ORAL
COMMUNITY
Start: 2023-10-16

## 2023-10-20 NOTE — PATIENT INSTRUCTIONS
Diabetes Mellitus and Foot Care  Diabetes, also called diabetes mellitus, may cause problems with your feet and legs because of poor blood flow (circulation). Poor circulation may make your skin:  Become thinner and drier.  Break more easily.  Heal more slowly.  Peel and crack.  You may also have nerve damage (neuropathy). This can cause decreased feeling in your legs and feet. This means that you may not notice minor injuries to your feet that could lead to more serious problems. Finding and treating problems early is the best way to prevent future foot problems.  How to care for your feet  Foot hygiene    Wash your feet daily with warm water and mild soap. Do not use hot water. Then, pat your feet and the areas between your toes until they are fully dry. Do not soak your feet. This can dry your skin.  Trim your toenails straight across. Do not dig under them or around the cuticle. File the edges of your nails with an emery board or nail file.  Apply a moisturizing lotion or petroleum jelly to the skin on your feet and to dry, brittle toenails. Use lotion that does not contain alcohol and is unscented. Do not apply lotion between your toes.  Shoes and socks  Wear clean socks or stockings every day. Make sure they are not too tight. Do not wear knee-high stockings. These may decrease blood flow to your legs.  Wear shoes that fit well and have enough cushioning. Always look in your shoes before you put them on to be sure there are no objects inside.  To break in new shoes, wear them for just a few hours a day. This prevents injuries on your feet.  Wounds, scrapes, corns, and calluses    Check your feet daily for blisters, cuts, bruises, sores, and redness. If you cannot see the bottom of your feet, use a mirror or ask someone for help.  Do not cut off corns or calluses or try to remove them with medicine.  If you find a minor scrape, cut, or break in the skin on your feet, keep it and the skin around it clean and  dry. You may clean these areas with mild soap and water. Do not clean the area with peroxide, alcohol, or iodine.  If you have a wound, scrape, corn, or callus on your foot, look at it several times a day to make sure it is healing and not infected. Check for:  Redness, swelling, or pain.  Fluid or blood.  Warmth.  Pus or a bad smell.  General tips  Do not cross your legs. This may decrease blood flow to your feet.  Do not use heating pads or hot water bottles on your feet. They may burn your skin. If you have lost feeling in your feet or legs, you may not know this is happening until it is too late.  Protect your feet from hot and cold by wearing shoes, such as at the beach or on hot pavement.  Schedule a complete foot exam at least once a year or more often if you have foot problems. Report any cuts, sores, or bruises to your health care provider right away.  Where to find more information  American Diabetes Association: diabetes.org  Association of Diabetes Care & Education Specialists: diabeteseducator.org  Contact a health care provider if:  You have a condition that increases your risk of infection, and you have any cuts, sores, or bruises on your feet.  You have an injury that is not healing.  You have redness on your legs or feet.  You feel burning or tingling in your legs or feet.  You have pain or cramps in your legs and feet.  Your legs or feet are numb.  Your feet always feel cold.  You have pain around any toenails.  Get help right away if:  You have a wound, scrape, corn, or callus on your foot and:  You have signs of infection.  You have a fever.  You have a red line going up your leg.  This information is not intended to replace advice given to you by your health care provider. Make sure you discuss any questions you have with your health care provider.  Document Revised: 06/21/2023 Document Reviewed: 06/21/2023  Elsevier Patient Education © 2023 Elsevier Inc.

## 2023-11-21 ENCOUNTER — TREATMENT (OUTPATIENT)
Dept: PHYSICAL THERAPY | Facility: CLINIC | Age: 58
End: 2023-11-21
Payer: MEDICAID

## 2023-11-21 DIAGNOSIS — M79.671 BILATERAL FOOT PAIN: Primary | ICD-10-CM

## 2023-11-21 DIAGNOSIS — R29.898 BILATERAL LEG WEAKNESS: ICD-10-CM

## 2023-11-21 DIAGNOSIS — M79.672 BILATERAL FOOT PAIN: Primary | ICD-10-CM

## 2023-11-21 PROCEDURE — 97161 PT EVAL LOW COMPLEX 20 MIN: CPT | Performed by: PHYSICAL THERAPIST

## 2023-11-21 NOTE — PROGRESS NOTES
Physical Therapy Initial Evaluation and Plan of Care  [address] [place] [address]    Patient: Catalino Sheehan               : 1965  Visit Date: 2023  Referring practitioner: Migue Alonzo DPM  Date of Initial Visit: 2023  Patient seen for 1 sessions    Visit Diagnoses:    ICD-10-CM ICD-9-CM   1. Bilateral foot pain  M79.671 729.5    M79.672    2. Bilateral leg weakness  R29.898 729.89     Past Medical History:   Diagnosis Date    Alcohol abuse     Anxiety     Bipolar 1 disorder     Chronic kidney disease, stage 3     Constipation     Family history of colonic polyps     History of adenomatous polyp of colon     Hypercholesterolemia     Hypertension     Manic depression     ALISHA (obstructive sleep apnea)     Type 2 diabetes mellitus      Past Surgical History:   Procedure Laterality Date    COLONOSCOPY  2016    Dr. Snyder-Diminuitive tubular adenomatous polyp was seen in the cecum; Diminuitive tubular adenomatous polyp in the descending colon; The patient had a very redundant colon; External hemorrhoids; Repeat 5 years    COLONOSCOPY N/A 2021    The entire examined colon is normal on direct and retroflexion views; No specimens collected; Repeat 5 years    ENDOSCOPY N/A 3/6/2023    Procedure: ESOPHAGOGASTRODUODENOSCOPY WITH ANESTHESIA;  Surgeon: Albertina Gillette MD;  Location: [facility] ENDOSCOPY;  Service: Gastroenterology;  Laterality: N/A;  pre: non cardiac chest pain  post: r/o David Cueva MD    EYE SURGERY  2018         SUBJECTIVE     Subjective Evaluation    History of Present Illness  Date of onset: 2022  Mechanism of injury: His c/c is madeline feet pain around his met heads and ankle. This has been going on for about a year. He has some back fatigue if he bend over for over a minute. If he's been walking for over 10 minutes, it will day a full day to feel better. He denies any swelling. He wears  diabetic shoes which might help a little bit.       Patient Occupation: not working Pain  Current pain ratin  At best pain ratin  At worst pain ratin  Location: madeline feet  Quality: knife-like  Relieving factors: rest  Aggravating factors: ambulation and standing    Social Support  Patient lives at: stairs to the basement.  Lives with: alone    Patient Goals  Patient goals for therapy: decreased pain, increased motion, increased strength and independence with ADLs/IADLs       Outcome Measure:   FAAM: 49%       OBJECTIVE     Objective          Ambulation     Comments   Walks with a forward stooped posture and toe out position with heavy heel strike.       Functional Assessment     Single Leg Stance   Left: 7 seconds  Right: 10 seconds      LE ROM and MMT Left Right   HIP AROM PROM MMT AROM PROM MMT   Flexion   5   5   Extension  10   10    Abduction         IR at 90         ER at 90                  KNEE         Flexion   5   5   Extension   5   5            ANKLE         DF  8 5  8 5   PF   5   5   Inversion         Eversion                   GREAT TOE         Extension   5   5   Flexion             No palpable tenderness in foot or ankle.     Therapy Education/Self Care 14642   Education offered today HEP below   [employer] Code Access Code: [email or web address]  URL: [email or web address]   Ongoing HEP     Date: 2023  Prepared by: Jer Gilman    Exercises  - Standing Hip Flexor Stretch  - 2 x daily - 7 x weekly - 2 sets - 10 reps  - Single Leg Stance with Support  - 1 x daily - 7 x weekly - 3 sets - 10 reps  - Lunge Matrix  - 2 x daily - 7 x weekly - 3 sets - 5 reps  - Bird Dog on Counter  - 2 x daily - 7 x weekly - 2 sets - 10 reps  - Supine Pelvic Floor Contraction  - 2 x daily - 7 x weekly - 2 sets - 10 reps   Timed Minutes        Total Timed Treatment:     0   mins  Total Time of Visit:            45   mins    ASSESSMENT/PLAN     GOALS:  Goals                                          Progress Note  due by 12/21/23                                                      Recert due by 2/18/24   LTG by: 6 weeks Comments Date Status   Improve madeline passive DF to 15 deg      SLS madeline LE 10 sec      Able to walk x 15 min without exacerbation of foot pain      FAAM score of 80%      Ind with HEP               Assessment & Plan       Assessment  Impairments: abnormal gait, abnormal or restricted ROM, lacks appropriate home exercise program, pain with function and weight-bearing intolerance   Functional limitations: walking, uncomfortable because of pain, standing and unable to perform repetitive tasks   Assessment details: His primary problem appears to be foot pain. His legs are actually fairly strong but his DF is tight and he tends to walk in a toe out position causing him to cody his ankle on heel strike and pinch the lateral ankle.   This patient would benefit from skilled PT.   Prognosis: good    Plan  Therapy options: will be seen for skilled therapy services  Planned modality interventions: dry needling, low level laser therapy and TENS  Planned therapy interventions: manual therapy, joint mobilization, soft tissue mobilization, stretching, strengthening, gait training, functional ROM exercises, therapeutic activities and home exercise program  Frequency: 3x week  Duration in weeks: 12  Treatment plan discussed with: patient  Plan details: Focus early on pain relief with STM and modalities including dry needling as needed. Work on release of the gastroc and mobilizations of the ankle. Progress with core and hip stability and improve gait mechanics to eventually wean off the boot. Progress HEP for the same.         SIGNATURE: Jer Gilman, PT, [place] License #: [id number]  Electronically Signed on 11/21/2023      Initial Certification  Certification Period: 11/21/2023 through 2/18/2024  I certify that the therapy services are furnished while this patient is under my care.  The services outlined above are required by  this patient, and will be reviewed every 90 days.     PHYSICIAN: Migue Alonzo DPM (NPI: [id number])    Signature____________________________________________DATE: _________     Please sign and return via fax to [phone].   Thank you so much for letting us work with Catalino. I appreciate your letting us work with your patients. If you have any questions or concerns, please don't hesitate to contact me.          [address] [place] [address]  [phone]

## 2023-11-28 ENCOUNTER — TREATMENT (OUTPATIENT)
Dept: PHYSICAL THERAPY | Facility: CLINIC | Age: 58
End: 2023-11-28
Payer: MEDICAID

## 2023-11-28 DIAGNOSIS — M79.671 BILATERAL FOOT PAIN: Primary | ICD-10-CM

## 2023-11-28 DIAGNOSIS — R29.898 BILATERAL LEG WEAKNESS: ICD-10-CM

## 2023-11-28 DIAGNOSIS — M79.672 BILATERAL FOOT PAIN: Primary | ICD-10-CM

## 2023-11-28 PROCEDURE — 97112 NEUROMUSCULAR REEDUCATION: CPT | Performed by: PHYSICAL THERAPIST

## 2023-11-28 PROCEDURE — 97140 MANUAL THERAPY 1/> REGIONS: CPT | Performed by: PHYSICAL THERAPIST

## 2023-11-28 NOTE — PROGRESS NOTES
Physical Therapy Treatment Note  [address] [place] [address]    Patient: Catalino Sheehan                                                 Visit Date: 2023  :     1965    Referring practitioner:    Migue Alonzo DPM  Date of Initial Visit:          Type: THERAPY  Noted: 2023    Patient seen for 2 sessions    Visit Diagnoses:    ICD-10-CM ICD-9-CM   1. Bilateral foot pain  M79.671 729.5    M79.672    2. Bilateral leg weakness  R29.898 729.89     SUBJECTIVE     Subjective: He says he was doing the single leg stance at home, and his L foot is sore after leaning to his L side a lot. States he has been able to walk for about 20 minutes now, but after he rests for an hour and starts to walk again his L foot with be sore.    PAIN: 0/10, sitting; walking > 5-6/10     OBJECTIVE     Objective     Neuromuscular Reeducation     75329 Comments   SLS B LE 20 sec x2   Tandem stance 20 sec x1   Mini squats x15   Step ups  Fwd, ea side X10; 6 inch step       Timed Minutes 15      Gait Training          78207   Task/Terrain Asst AD Comments   Flat Hallway N/A N/A 200' ; Slight decreased heel-toe pattern               Timed Minutes 5         Manual Therapy     74825  Comments   Madeline foot PROM Decreased madeline eversion   Ankle mobs Ant, post               Timed Minutes 16     Therapy Education/Self Care 35681   Education offered today HEP   [facility] Code [email or web address]   Ongoing HEP      Date: 2023  Prepared by: Jer Gilman     Exercises  - Standing Hip Flexor Stretch  - 2 x daily - 7 x weekly - 2 sets - 10 reps  - Single Leg Stance with Support  - 1 x daily - 7 x weekly - 3 sets - 10 reps  - Lunge Matrix  - 2 x daily - 7 x weekly - 3 sets - 5 reps  - Bird Dog on Counter  - 2 x daily - 7 x weekly - 2 sets - 10 reps  - Supine Pelvic Floor Contraction  - 2 x daily - 7 x weekly - 2 sets - 10 reps   Timed Minutes        Total Timed Treatment:     36    mins  Total Time of Visit:             36   mins         ASSESSMENT/PLAN     GOALS:  Goals                                    Progress Note due by 12/21/23                                                      Recert due by 2/18/24   LTG by: 6 weeks Comments Date Status   Improve madeline passive DF to 15 deg     ongoing   SLS madeline LE 10 sec  20 sec but needed UE support  11/28 ongoing   Able to walk x 15 min without exacerbation of foot pain     ongoing   FAAM score of 80%     ongoing   Ind with HEP  Clarified HEP  11/28 ongoing     Assessment/Plan     ASSESSMENT: No goals have been met at this time; however, this was his first tx since initial evaluation. Pt reported he has been able to walk for almost 20 minutes with no pain, but if he rests for about an hour he will have pain when walking again. He required min UE support during SLS/Tandem, and presented with occasional min discomfort in his L foot during step ups.    PLAN: Continue with current POC to improve mobility and balance    SIGNATURE: Juan Eldridge PTA, [place] License #: [id number]  Electronically Signed on 11/28/2023        [facility] [place] [address]  [phone]

## 2023-12-06 ENCOUNTER — TREATMENT (OUTPATIENT)
Dept: PHYSICAL THERAPY | Facility: CLINIC | Age: 58
End: 2023-12-06
Payer: MEDICAID

## 2023-12-06 DIAGNOSIS — M79.672 BILATERAL FOOT PAIN: Primary | ICD-10-CM

## 2023-12-06 DIAGNOSIS — M79.671 BILATERAL FOOT PAIN: Primary | ICD-10-CM

## 2023-12-06 DIAGNOSIS — R29.898 BILATERAL LEG WEAKNESS: ICD-10-CM

## 2023-12-06 PROCEDURE — 97112 NEUROMUSCULAR REEDUCATION: CPT | Performed by: PHYSICAL THERAPIST

## 2023-12-06 PROCEDURE — 97140 MANUAL THERAPY 1/> REGIONS: CPT | Performed by: PHYSICAL THERAPIST

## 2023-12-06 NOTE — PROGRESS NOTES
Physical Therapy Treatment Note  [address] [place] [address]    Patient: Catalino Sheehan                                                 Visit Date: 2023  :     1965    Referring practitioner:    Migue Alonzo DPM  Date of Initial Visit:          Type: THERAPY  Noted: 2023    Patient seen for 3 sessions    Visit Diagnoses:    ICD-10-CM ICD-9-CM   1. Bilateral foot pain  M79.671 729.5    M79.672    2. Bilateral leg weakness  R29.898 729.89     SUBJECTIVE     Subjective: He reports no pain today, but yesterday he walked for 10 minutes and after a rest breaks he got up and had pain. States it is mainly when his toes are bent and he is pushing off when walking. Last visit it was more on top of his foot, but yesterday it was closer to his toes.    PAIN: 0/10, sitting; walking > 5-6/10     OBJECTIVE     Objective     Neuromuscular Reeducation     35884 Comments   SLS B LE 20 sec x2   Tandem stance 20 sec x2   Mini squats x15   Step ups  4 way X10; 6 inch step   Bosu lunges X10; fwd/side   Timed Minutes 24      Gait Training          89520   Task/Terrain Asst AD Comments   Flat Hallway N/A N/A 200' ; no pain               Timed Minutes 5         Manual Therapy     24065  Comments   Madeline foot PROM Decreased madeline eversion   Ankle mobs Ant, post               Timed Minutes 16     Therapy Education/Self Care 78388   Education offered today HEP   [employer] Code [email or web address]   Ongoing HEP      Date: 2023  Prepared by: Jer Gilman     Exercises  - Standing Hip Flexor Stretch  - 2 x daily - 7 x weekly - 2 sets - 10 reps  - Single Leg Stance with Support  - 1 x daily - 7 x weekly - 3 sets - 10 reps  - Lunge Matrix  - 2 x daily - 7 x weekly - 3 sets - 5 reps  - Bird Dog on Counter  - 2 x daily - 7 x weekly - 2 sets - 10 reps  - Supine Pelvic Floor Contraction  - 2 x daily - 7 x weekly - 2 sets - 10 reps   Timed Minutes        Total Timed  Treatment:     40   mins  Total Time of Visit:             45   mins         ASSESSMENT/PLAN     GOALS:  Goals                                    Progress Note due by 12/21/23                                                      Recert due by 2/18/24   LTG by: 6 weeks Comments Date Status   Improve madeline passive DF to 15 deg     ongoing   SLS madeline LE 10 sec  20 sec but needed UE support  11/28 ongoing   Able to walk x 15 min without exacerbation of foot pain  Has pain post rest after 10 min of walking  12/6 ongoing   FAAM score of 80%     ongoing   Ind with HEP  Clarified HEP  11/28 ongoing     Assessment/Plan     ASSESSMENT: Pt reported he ambulated for 10 minutes yesterday and took a break, then when he went to resume ambulating he had pain over his 3rd toe on his L foot. He cont to required min UE support during SLS/Tandem, and reported slight discomfort in his L foot following step ups. He reported no pain following ambulating 200 feet, or after completion of treatment.    PLAN: Continue with current POC to improve mobility and balance    SIGNATURE: Juan Eldridge PTA, [place] License #: [id number]  Electronically Signed on 12/6/2023        [address] [place] [address] [place]  [place] [address]  [phone]

## 2023-12-20 ENCOUNTER — TREATMENT (OUTPATIENT)
Dept: PHYSICAL THERAPY | Facility: CLINIC | Age: 58
End: 2023-12-20
Payer: MEDICAID

## 2023-12-20 DIAGNOSIS — R29.898 BILATERAL LEG WEAKNESS: ICD-10-CM

## 2023-12-20 DIAGNOSIS — M79.671 BILATERAL FOOT PAIN: Primary | ICD-10-CM

## 2023-12-20 DIAGNOSIS — M79.672 BILATERAL FOOT PAIN: Primary | ICD-10-CM

## 2023-12-20 PROCEDURE — 97140 MANUAL THERAPY 1/> REGIONS: CPT | Performed by: PHYSICAL THERAPIST

## 2023-12-20 PROCEDURE — 97110 THERAPEUTIC EXERCISES: CPT | Performed by: PHYSICAL THERAPIST

## 2023-12-20 NOTE — PROGRESS NOTES
"                                                                Physical Therapy Treatment Note and 30 Day Progress Note  [address] [place] [address]    Patient: Catalino Sheehan                                                 Visit Date: 2023  :     1965    Referring practitioner:    Migue Alonzo DPM  Date of Initial Visit:          Type: THERAPY  Noted: 2023    Patient seen for 4 sessions    Visit Diagnoses:    ICD-10-CM ICD-9-CM   1. Bilateral foot pain  M79.671 729.5    M79.672    2. Bilateral leg weakness  R29.898 729.89     SUBJECTIVE     Subjective: He says his right foot pain is good but his left foot is still hurting when he walks. He walked 15 minutes today and his whole foot into the ankle is hurting.     PAIN: 0/10, sitting; walking > 6-7/10     OBJECTIVE     Objective     Therapeutic Exercises    19572 Units Comments   DF passive stretch     SLS with opposite hip flex/abd/extension madeline 15\" x 3 eac    Standing gastroc/soleus stretch               Timed Minutes 25       Manual Therapy     51471  Comments   Madeline TC post mob with DF                    Timed Minutes 15     Therapy Education/Self Care 13095   Education offered today HEP  Clarified calf stretches and emphasized SLS   [employer] Code [email or web address]   Ongoing HEP      Date: 2023  Prepared by: Jer Gilman     Exercises  - Standing Hip Flexor Stretch  - 2 x daily - 7 x weekly - 2 sets - 10 reps  - Single Leg Stance with Support  - 1 x daily - 7 x weekly - 3 sets - 10 reps  - Lunge Matrix  - 2 x daily - 7 x weekly - 3 sets - 5 reps  - Bird Dog on Counter  - 2 x daily - 7 x weekly - 2 sets - 10 reps  - Supine Pelvic Floor Contraction  - 2 x daily - 7 x weekly - 2 sets - 10 reps   Timed Minutes        Total Timed Treatment:     45   mins  Total Time of Visit:             45   mins         ASSESSMENT/PLAN     GOALS:  Goals                                    Progress Note due by 23                                        "               Recert due by 2/18/24   LTG by: 6 weeks Comments Date Status   Improve madeline passive DF to 15 deg  left 15 deg, right 8 deg 12/20 progressing   SLS madeline LE 10 sec 10+ sec  12/20 MET   Able to walk x 15 min without exacerbation of foot pain Walked 15' but increased foot pain to 6-7/10  12/20 ongoing   FAAM score of 80%  50% on 12/20 12/20 ongoing   Ind with HEP Went through his stretches for his calves; advised on SLS to use UE support if needed 12/20 ongoing     Assessment & Plan       Assessment  Impairments: abnormal gait, abnormal or restricted ROM, lacks appropriate home exercise program, pain with function and weight-bearing intolerance   Functional limitations: walking, uncomfortable because of pain, standing and unable to perform repetitive tasks   Prognosis: good    Plan  Therapy options: will be seen for skilled therapy services  Planned modality interventions: dry needling, low level laser therapy and TENS  Planned therapy interventions: manual therapy, joint mobilization, soft tissue mobilization, stretching, strengthening, gait training, functional ROM exercises, therapeutic activities and home exercise program  Frequency: 3x week  Duration in weeks: 12  Treatment plan discussed with: patient         ASSESSMENT: Pt met his goal for SLS. His right foot is better but he strained his left foot early in his HEP when he lost his balance so it's still hurting. Our emphasis has been on improving flexibility of his DF and hip/core stability to improve the mechanics of his gait.     PLAN: Continue with current POC to improve mobility and balance    SIGNATURE: Jer Gilman, PT, [place] License #: [id number]  Electronically Signed on 12/20/2023        [address] [place] [address] [place] [address]  [phone]

## 2023-12-27 ENCOUNTER — TREATMENT (OUTPATIENT)
Dept: PHYSICAL THERAPY | Facility: CLINIC | Age: 58
End: 2023-12-27
Payer: MEDICAID

## 2023-12-27 DIAGNOSIS — M79.672 BILATERAL FOOT PAIN: Primary | ICD-10-CM

## 2023-12-27 DIAGNOSIS — R29.898 BILATERAL LEG WEAKNESS: ICD-10-CM

## 2023-12-27 DIAGNOSIS — M79.671 BILATERAL FOOT PAIN: Primary | ICD-10-CM

## 2023-12-27 PROCEDURE — 97110 THERAPEUTIC EXERCISES: CPT | Performed by: PHYSICAL THERAPIST

## 2023-12-27 PROCEDURE — 97112 NEUROMUSCULAR REEDUCATION: CPT | Performed by: PHYSICAL THERAPIST

## 2023-12-27 NOTE — PROGRESS NOTES
Physical Therapy Treatment Note  [address] [place] [address]    Patient: Catalino Sheehan                                                 Visit Date: 2023  :     1965    Referring practitioner:    Migue Alonzo DPM  Date of Initial Visit:          Type: THERAPY  Noted: 2023    Patient seen for 5 sessions    Visit Diagnoses:    ICD-10-CM ICD-9-CM   1. Bilateral foot pain  M79.671 729.5    M79.672    2. Bilateral leg weakness  R29.898 729.89     SUBJECTIVE     Subjective:He reports his L foot is giving him trouble but the R isn't       PAIN: 3/10         OBJECTIVE     Objective     Therapeutic Exercises    55917 Units Comments   B LE Shuttle Press with eccentric focus 5 bands x 5 min     B unilateral LE Shuttle Press with eccentric focus 3 bands x 5 min each                     Timed Minutes 15      Neuromuscular Reeducation     09097 Comments   Seated B unilateral DF/PF pronation/supination BAPS L2                    Timed Minutes 15        Therapy Education/Self Care 22486   Education offered today    [facility] Code [email or web address]    Ongoing HEP   Date: 2023  Prepared by: Jer Gilman     Exercises  - Standing Hip Flexor Stretch  - 2 x daily - 7 x weekly - 2 sets - 10 reps  - Single Leg Stance with Support  - 1 x daily - 7 x weekly - 3 sets - 10 reps  - Lunge Matrix  - 2 x daily - 7 x weekly - 3 sets - 5 reps  - Bird Dog on Counter  - 2 x daily - 7 x weekly - 2 sets - 10 reps  - Supine Pelvic Floor Contraction  - 2 x daily - 7 x weekly - 2 sets - 10 reps   Timed Minutes        Total Timed Treatment:     30   mins  Total Time of Visit:             30   mins         ASSESSMENT/PLAN     GOALS  Goals                                    Progress Note due by 23                                                      Recert due by 24   LTG by: 6 weeks Comments Date Status   Improve madeline passive DF to 15 deg  left 15 deg, right  8 deg 12/20 progressing   SLS madeline LE 10 sec 10+ sec  12/20 MET   Able to walk x 15 min without exacerbation of foot pain Walked 15' but increased foot pain to 6-7/10  12/20 ongoing   FAAM score of 80%  50% on 12/20 12/20 ongoing   Ind with HEP Went through his stretches for his calves; advised on SLS to use UE support if needed 12/20 ongoing       Assessment/Plan     ASSESSMENT:   His last session was a progress note; therefore, there are no significant changes in regards to his POC goals.    PLAN:   Continue to progress as symptoms allow.    SIGNATURE: Chris Car, \[facility]\"", [place] License #: [id number]  Electronically Signed on 12/27/2023        [address] [place] [address]. [address]  [phone]

## 2024-01-03 ENCOUNTER — TREATMENT (OUTPATIENT)
Dept: PHYSICAL THERAPY | Facility: CLINIC | Age: 59
End: 2024-01-03
Payer: MEDICAID

## 2024-01-03 DIAGNOSIS — M79.671 BILATERAL FOOT PAIN: Primary | ICD-10-CM

## 2024-01-03 DIAGNOSIS — M79.672 BILATERAL FOOT PAIN: Primary | ICD-10-CM

## 2024-01-03 DIAGNOSIS — R29.898 BILATERAL LEG WEAKNESS: ICD-10-CM

## 2024-01-03 PROCEDURE — 97140 MANUAL THERAPY 1/> REGIONS: CPT | Performed by: PHYSICAL THERAPIST

## 2024-01-03 PROCEDURE — 97112 NEUROMUSCULAR REEDUCATION: CPT | Performed by: PHYSICAL THERAPIST

## 2024-01-03 PROCEDURE — 97110 THERAPEUTIC EXERCISES: CPT | Performed by: PHYSICAL THERAPIST

## 2024-01-03 NOTE — PROGRESS NOTES
Physical Therapy Treatment Note  [address] [place] [address]    Patient: Catalino Sheehan                                                 Visit Date: 1/3/2024  :     1965    Referring practitioner:    Migue Alonzo DPM  Date of Initial Visit:          Type: THERAPY  Noted: 2023    Patient seen for 6 sessions    Visit Diagnoses:    ICD-10-CM ICD-9-CM   1. Bilateral foot pain  M79.671 729.5    M79.672    2. Bilateral leg weakness  R29.898 729.89     SUBJECTIVE     Subjective:He reports he walked for about 20 minutes yesterday and his feet are hurting today. Notes his R foot hurts where his toes meet his foot, especially when he bends them back.     PAIN: 4/10         OBJECTIVE     Objective     Therapeutic Exercises    14296 Units Comments   B LE Shuttle Press with eccentric focus 5 bands x 5 min     B unilateral LE Shuttle Press with eccentric focus 3 bands x 5 min each      Standing calf/solues stretch 2x30 sec ea              Timed Minutes 15      Neuromuscular Reeducation     98367 Comments   Seated B unilateral DF/PF pronation/supination BAPS L2 X15 ea   SLS  2x30 sec       Timed Minutes 14      Manual Therapy     97214  Comments   Raman TC post mob with DF     DF passive stretch      Timed Minutes 12       Therapy Education/Self Care 80203   Education offered today    [facility] Code [id number]    Ongoing HEP   Date: 2023  Prepared by: Jer Gilman     Exercises  - Standing Hip Flexor Stretch  - 2 x daily - 7 x weekly - 2 sets - 10 reps  - Single Leg Stance with Support  - 1 x daily - 7 x weekly - 3 sets - 10 reps  - Lunge Matrix  - 2 x daily - 7 x weekly - 3 sets - 5 reps  - Bird Dog on Counter  - 2 x daily - 7 x weekly - 2 sets - 10 reps  - Supine Pelvic Floor Contraction  - 2 x daily - 7 x weekly - 2 sets - 10 reps   Timed Minutes        Total Timed Treatment:     41   mins  Total Time of Visit:             41   mins          ASSESSMENT/PLAN     GOALS  Goals                                    Progress Note due by 1/19/23                                                      Recert due by 2/18/24   LTG by: 6 weeks Comments Date Status   Improve madeline passive DF to 15 deg  left 15 deg, right 8 deg 12/20 progressing   SLS madeline LE 10 sec 10+ sec  12/20 MET   Able to walk x 15 min without exacerbation of foot pain Walked 15' but increased foot pain to 6-7/10  12/20 ongoing   FAAM score of 80%  50% on 12/20 12/20 ongoing   Ind with HEP Went through his stretches for his calves; advised on SLS to use UE support if needed 12/20 ongoing       Assessment/Plan     ASSESSMENT: Pt reported increased B foot pain today, noting he walked for 20 minutes yesterday as he was feeling better. States he woke up with increased pain, tightness, and stiffness and it has hurt to walk today. He presented with cont tightness with DF R>L, noting some weakness in his R LE during SL shuttle press. Pt reported decreased tightness and pain following treatment.      PLAN:   Continue to progress as symptoms allow.    SIGNATURE: Juan Eldridge PTA, [place] License #: [id number]  Electronically Signed on 1/3/2024        [address] [place] [address] [place] [address]  [phone]

## 2024-01-10 ENCOUNTER — TREATMENT (OUTPATIENT)
Dept: PHYSICAL THERAPY | Facility: CLINIC | Age: 59
End: 2024-01-10
Payer: MEDICAID

## 2024-01-10 DIAGNOSIS — M79.672 BILATERAL FOOT PAIN: Primary | ICD-10-CM

## 2024-01-10 DIAGNOSIS — M79.671 BILATERAL FOOT PAIN: Primary | ICD-10-CM

## 2024-01-10 DIAGNOSIS — R29.898 BILATERAL LEG WEAKNESS: ICD-10-CM

## 2024-01-10 PROCEDURE — 97110 THERAPEUTIC EXERCISES: CPT | Performed by: PHYSICAL THERAPIST

## 2024-01-10 PROCEDURE — 97140 MANUAL THERAPY 1/> REGIONS: CPT | Performed by: PHYSICAL THERAPIST

## 2024-01-10 PROCEDURE — 97112 NEUROMUSCULAR REEDUCATION: CPT | Performed by: PHYSICAL THERAPIST

## 2024-01-10 NOTE — PROGRESS NOTES
Physical Therapy Treatment Note  [address] [place] [address]    Patient: Catalino Sheehan                                                 Visit Date: 1/10/2024  :     1965    Referring practitioner:    Migue Alonzo DPM  Date of Initial Visit:          Type: THERAPY  Noted: 2023    Patient seen for 7 sessions    Visit Diagnoses:    ICD-10-CM ICD-9-CM   1. Bilateral foot pain  M79.671 729.5    M79.672    2. Bilateral leg weakness  R29.898 729.89     SUBJECTIVE     Subjective:He felt really good this morning walking barefooted for a while. But when he put his shoes on they started to hurt.     PAIN: 6/10 walking         OBJECTIVE     Objective     Therapeutic Exercises    79409 Units Comments   B LE Shuttle Press with eccentric focus 5 bands x 5 min     B unilateral LE Shuttle Press with eccentric focus 3 bands x 5 min each      Standing calf/solues stretch 2x30 sec ea         Timed Minutes 16      Neuromuscular Reeducation     73391 Comments   Seated B unilateral DF/PF pronation/supination CW/CCW BAPS L3 X15 ea   SLS  2x30 sec   Tandem on foam 2x30 sec   Timed Minutes 15      Manual Therapy     63488  Comments   Raman TC post mob with DF     DF passive stretch      Timed Minutes 12       Therapy Education/Self Care [id number]   Education offered today    [facility] Code [email or web address]    Ongoing HEP   Date: 2023  Prepared by: Jer Gilman     Exercises  - Standing Hip Flexor Stretch  - 2 x daily - 7 x weekly - 2 sets - 10 reps  - Single Leg Stance with Support  - 1 x daily - 7 x weekly - 3 sets - 10 reps  - Lunge Matrix  - 2 x daily - 7 x weekly - 3 sets - 5 reps  - Bird Dog on Counter  - 2 x daily - 7 x weekly - 2 sets - 10 reps  - Supine Pelvic Floor Contraction  - 2 x daily - 7 x weekly - 2 sets - 10 reps   Timed Minutes        Total Timed Treatment:     43   mins  Total Time of Visit:             43   mins         ASSESSMENT/PLAN      GOALS  Goals                                    Progress Note due by 1/19/23                                                      Recert due by 2/18/24   LTG by: 6 weeks Comments Date Status   Improve madeline passive DF to 15 deg  left 15 deg, right 8 deg 12/20 progressing   SLS madeline LE 10 sec 10+ sec  12/20 MET   Able to walk x 15 min without exacerbation of foot pain Walked 15' but increased foot pain to 6-7/10  12/20 ongoing   FAAM score of 80%  50% on 12/20 12/20 ongoing   Ind with HEP Went through his stretches for his calves; advised on SLS to use UE support if needed 12/20 ongoing       Assessment/Plan     ASSESSMENT: Pt reported no B foot pain today, noting he walked barefoot a lot of the morning with no issues but once he put his shoes on his feet started to hurt. Stated he feels stronger than before, but still has discomfort when he walks. He presented with cont tightness with DF R>L, noting decreased weakness in his B LE following DL/SL shuttle press compared to last visit. Pt reported decreased tightness following treatment.      PLAN:   Continue to progress as symptoms allow.    SIGNATURE: Juan Eldridge PTA, [place] License #: [id number]  Electronically Signed on 1/10/2024        [facility]  [place] [address]  [phone]

## 2024-01-17 ENCOUNTER — TREATMENT (OUTPATIENT)
Dept: PHYSICAL THERAPY | Facility: CLINIC | Age: 59
End: 2024-01-17
Payer: MEDICAID

## 2024-01-17 DIAGNOSIS — M79.672 BILATERAL FOOT PAIN: Primary | ICD-10-CM

## 2024-01-17 DIAGNOSIS — M79.671 BILATERAL FOOT PAIN: Primary | ICD-10-CM

## 2024-01-17 DIAGNOSIS — R29.898 BILATERAL LEG WEAKNESS: ICD-10-CM

## 2024-01-17 PROCEDURE — 97110 THERAPEUTIC EXERCISES: CPT | Performed by: PHYSICAL THERAPIST

## 2024-01-17 PROCEDURE — 97140 MANUAL THERAPY 1/> REGIONS: CPT | Performed by: PHYSICAL THERAPIST

## 2024-01-17 NOTE — PROGRESS NOTES
Physical Therapy Treatment Note  [address] [place] [address]    Patient: Catalino Sheehan                                                 Visit Date: 2024  :     1965    Referring practitioner:    Migue Alonzo DPM  Date of Initial Visit:          Type: THERAPY  Noted: 2023    Patient seen for 8 sessions    Visit Diagnoses:    ICD-10-CM ICD-9-CM   1. Bilateral foot pain  M79.671 729.5    M79.672    2. Bilateral leg weakness  R29.898 729.89     SUBJECTIVE     Subjective:He reports yesterday he walked 20 minutes didn't have pain until after he sat for a while. He reports pain at the base of the R toes and L heel.       PAIN: 5/10         OBJECTIVE     Objective     Manual Therapy     54252  Comments   IASTM B unilateral gastroc with Edge tool                    Timed Minutes 10         Therapeutic Exercises    35598 Units Comments   B DF stretches on Prostretch      Seated marble pick ups      B SL clamshells with green T band x20     B unilateral hip ER stretches with intermittent inferior lateral glides          Timed Minutes 32        Therapy Education/Self Care 48015   Education offered today    [facility] [email or web address]    Ongoing HEP   Date: 2023  Prepared by: Jer Gilman     Exercises  - Standing Hip Flexor Stretch  - 2 x daily - 7 x weekly - 2 sets - 10 reps  - Single Leg Stance with Support  - 1 x daily - 7 x weekly - 3 sets - 10 reps  - Lunge Matrix  - 2 x daily - 7 x weekly - 3 sets - 5 reps  - Bird Dog on Counter  - 2 x daily - 7 x weekly - 2 sets - 10 reps  - Supine Pelvic Floor Contraction  - 2 x daily - 7 x weekly - 2 sets - 10 reps   Timed Minutes        Total Timed Treatment:     42   mins  Total Time of Visit:             42   mins         ASSESSMENT/PLAN     GOALS  Goals                                    Progress Note due by 23                                                      Recert due by  2/18/24   LTG by: 6 weeks Comments Date Status   Improve madeline passive DF to 15 deg  left 15 deg, right 8 deg 12/20 progressing   SLS madeline LE 10 sec 10+ sec  12/20 MET   Able to walk x 15 min without exacerbation of foot pain Walked 15' but increased foot pain to 6-7/10  12/20 ongoing   FAAM score of 80%  50% on 12/20 12/20 ongoing   Ind with HEP Reinforced stretching today 1/ ongoing       Assessment/Plan     ASSESSMENT:   Prior to the initiation of today's session he canceled his remaining PT sessions upon arriving at check in today. He did not further elaborate on this during today's session.     PLAN:   Place POC on hold at this time.    SIGNATURE: Chris Car, [facility], [place] License #: [id number]  Electronically Signed on 1/17/2024        [address] [place] [address] [place] [address]  [phone]

## 2024-01-25 ENCOUNTER — TRANSCRIBE ORDERS (OUTPATIENT)
Dept: ADMINISTRATIVE | Facility: HOSPITAL | Age: 59
End: 2024-01-25
Payer: MEDICAID

## 2024-01-25 DIAGNOSIS — I73.9 PAD (PERIPHERAL ARTERY DISEASE): Primary | ICD-10-CM

## 2024-02-01 ENCOUNTER — HOSPITAL ENCOUNTER (OUTPATIENT)
Dept: ULTRASOUND IMAGING | Facility: HOSPITAL | Age: 59
Discharge: HOME | End: 2024-02-01
Admitting: FAMILY MEDICINE
Payer: MEDICAID

## 2024-02-01 DIAGNOSIS — I73.9 PAD (PERIPHERAL ARTERY DISEASE): ICD-10-CM

## 2024-02-01 PROCEDURE — 93923 UPR/LXTR ART STDY 3+ LVLS: CPT

## 2024-02-27 RX ORDER — AMITRIPTYLINE HYDROCHLORIDE 25 MG/1
TABLET, FILM COATED ORAL
Qty: 60 TABLET | Refills: 5 | Status: SHIPPED | OUTPATIENT
Start: 2024-02-27

## 2024-02-27 NOTE — TELEPHONE ENCOUNTER
Requested Prescriptions     Pending Prescriptions Disp Refills    amitriptyline (ELAVIL) 25 MG tablet [Pharmacy Med Name: AMITRIPTYLINE HCL 25 MG TAB] 60 tablet 5     Sig: TAKE 1 TO 2 TABLETS BY MOUTH AT BEDTIME       Last Office Visit: 6/8/2023  Next Office Visit: 6/11/2024  Last Medication Refill: 8/21/2023 with 5 RF

## 2024-04-26 NOTE — TELEPHONE ENCOUNTER
Brendon Kramer has requested a refill on his medication.      Last office visit : 6/8/2023   Next office visit : 6/11/2024         Requested Prescriptions     Pending Prescriptions Disp Refills    amitriptyline (ELAVIL) 25 MG tablet [Pharmacy Med Name: AMITRIPTYLINE HCL 25 MG TAB] 180 tablet 2     Sig: TAKE 1 TO 2 TABLETS BY MOUTH AT BEDTIME

## 2024-04-27 RX ORDER — AMITRIPTYLINE HYDROCHLORIDE 25 MG/1
TABLET, FILM COATED ORAL
Qty: 180 TABLET | Refills: 2 | Status: SHIPPED | OUTPATIENT
Start: 2024-04-27

## 2024-05-20 ENCOUNTER — TELEPHONE (OUTPATIENT)
Dept: NEUROLOGY | Age: 59
End: 2024-05-20

## 2024-05-20 NOTE — TELEPHONE ENCOUNTER
Patient called again to get a sooner appt. Pt want to be seen sooner than June 11th. Please advise.

## 2024-05-20 NOTE — TELEPHONE ENCOUNTER
Brendon called to reschedule his appt on 6/11. He states it needs to be after 1:00. [facility] was unable to accommodate in the time frame requested.    Please be advised that the best time to call him to accommodate their needs is  as soon as possible .     Thank you.

## 2024-05-21 NOTE — TELEPHONE ENCOUNTER
I have called and spoke with patient to let him know that as of now Dr. Vo does not have any sooner openings. Patient stated he will keep his appointment on June 11th.

## 2024-06-11 ENCOUNTER — OFFICE VISIT (OUTPATIENT)
Dept: NEUROLOGY | Age: 59
End: 2024-06-11
Payer: MEDICAID

## 2024-06-11 VITALS
HEART RATE: 91 BPM | HEIGHT: 72 IN | WEIGHT: 295 LBS | BODY MASS INDEX: 39.96 KG/M2 | RESPIRATION RATE: 20 BRPM | DIASTOLIC BLOOD PRESSURE: 80 MMHG | SYSTOLIC BLOOD PRESSURE: 132 MMHG

## 2024-06-11 DIAGNOSIS — E11.42 DIABETIC POLYNEUROPATHY ASSOCIATED WITH TYPE 2 DIABETES MELLITUS (HCC): ICD-10-CM

## 2024-06-11 DIAGNOSIS — F51.05 INSOMNIA DUE TO OTHER MENTAL DISORDER: ICD-10-CM

## 2024-06-11 DIAGNOSIS — G21.11 NEUROLEPTIC-INDUCED PARKINSONISM (HCC): ICD-10-CM

## 2024-06-11 DIAGNOSIS — G47.33 OBSTRUCTIVE SLEEP APNEA: Primary | ICD-10-CM

## 2024-06-11 DIAGNOSIS — F99 INSOMNIA DUE TO OTHER MENTAL DISORDER: ICD-10-CM

## 2024-06-11 DIAGNOSIS — T43.505A NEUROLEPTIC-INDUCED PARKINSONISM (HCC): ICD-10-CM

## 2024-06-11 PROCEDURE — 99213 OFFICE O/P EST LOW 20 MIN: CPT | Performed by: PSYCHIATRY & NEUROLOGY

## 2024-06-11 PROCEDURE — 3075F SYST BP GE 130 - 139MM HG: CPT | Performed by: PSYCHIATRY & NEUROLOGY

## 2024-06-11 PROCEDURE — 3079F DIAST BP 80-89 MM HG: CPT | Performed by: PSYCHIATRY & NEUROLOGY

## 2024-06-11 RX ORDER — OMEPRAZOLE 40 MG/1
CAPSULE, DELAYED RELEASE ORAL
COMMUNITY

## 2024-06-11 RX ORDER — NIACIN 500 MG/1
TABLET, EXTENDED RELEASE ORAL
COMMUNITY
Start: 2024-03-25

## 2024-06-11 RX ORDER — LEVOTHYROXINE SODIUM 0.1 MG/1
TABLET ORAL
COMMUNITY
Start: 2024-03-25

## 2024-06-11 RX ORDER — DAPAGLIFLOZIN 10 MG/1
10 TABLET, FILM COATED ORAL DAILY
COMMUNITY

## 2024-06-11 RX ORDER — UREA 10 %
5 LOTION (ML) TOPICAL NIGHTLY PRN
COMMUNITY

## 2024-06-11 RX ORDER — ERGOCALCIFEROL 1.25 MG/1
CAPSULE ORAL
COMMUNITY

## 2024-06-11 NOTE — PROGRESS NOTES
disorder  F51.05     F99       4. Neuroleptic-induced parkinsonism (HCC)  G21.11     T43.505A       He is objectively compliant with and clinically benefiting from BiPAP use.  I discussed the tremor and neuroleptic induced parkinsonism with him.  He was informed that the treatment of choice is to get off this kind of medication.  I discussed treatment options for the PN as well.      Plan:   Continue BiPAP use during sleep  Start taking alpha lipoic acid OTC for the neuropathy  Follow up here in a year.    Electronically signed by Wilbert Mills MD on 6/11/24

## 2024-06-11 NOTE — PATIENT INSTRUCTIONS
INSTRUCTIONS:  Continue BiPAP use during sleep  Start taking alpha lipoic acid OTC for the neuropathy

## 2024-08-02 NOTE — PROGRESS NOTES
[facility] - PODIATRY    Today's Date: 08/07/2024     Patient Name: Catalino Sheehan  MRN: [id number]  CSN: [id number]  PCP: Guillermina Marcus PA  Referring Provider: No ref. provider found    SUBJECTIVE     Chief Complaint   Patient presents with    Follow-up     David Tatum MD 07/14/24  3 MTH FU DIABETIC nail care. Patient states he has some callus and nail fungus.    Diabetes     HPI: Catalino Sheehan, a 58 y.o.male, comes to clinic as a(n) new patient presenting for diabetic foot exam and complaining of toenail/callus issues. Patient has h/o alcohol abuse, anxiety, bipolar 1, CKD 3, hypercholesterolemia, HTN, depression, ALISHA, DM 2 . Patient is NIDDM with last stated BG level of 106mg/dl.  Denies significant numbness and tingling in his feet.  Denies open wounds or sores.  Relates that his toenails are long, thick, and discolored. Relays somewhat difficulty caring for them himself. Several calluses to his feet have returned.  Admits to frequently walking barefoot.Relays he has been using Nonyx an OTC antifungal nail treatment for the past year. Has seen some improvement to the nail since starting but not much. He is interested in the different treatment options that are available.  Admits pain at 5/10 level and described as aching, nagging, and dull. Denies previous treatment. Denies any constitutional symptoms. No other pedal complaints at this time.      Past Medical History:   Diagnosis Date    Alcohol abuse     Anxiety     Bipolar 1 disorder     Chronic kidney disease, stage 3     Constipation     Family history of colonic polyps     History of adenomatous polyp of colon     Hypercholesterolemia     Hypertension     Manic depression     ALISHA (obstructive sleep apnea)     Type 2 diabetes mellitus      Past Surgical History:   Procedure Laterality Date    COLONOSCOPY  11/09/2016    Dr. Snyder-Diminuitive tubular adenomatous polyp was seen in the cecum; Diminuitive tubular adenomatous  polyp in the descending colon; The patient had a very redundant colon; External hemorrhoids; Repeat 5 years    COLONOSCOPY N/A 04/22/2021    The entire examined colon is normal on direct and retroflexion views; No specimens collected; Repeat 5 years    ENDOSCOPY N/A 3/6/2023    Procedure: ESOPHAGOGASTRODUODENOSCOPY WITH ANESTHESIA;  Surgeon: Albertina Gillette MD;  Location: [facility] ENDOSCOPY;  Service: Gastroenterology;  Laterality: N/A;  pre: non cardiac chest pain  post: r/o David Cueva MD    EYE SURGERY  12/2018     Family History   Problem Relation Age of Onset    Colon polyps Mother         Unknown age     Lung cancer Mother     Lung cancer Father     Colon cancer Neg Hx     Esophageal cancer Neg Hx     Liver cancer Neg Hx     Liver disease Neg Hx     Rectal cancer Neg Hx     Stomach cancer Neg Hx      Social History     Socioeconomic History    Marital status: Single   Tobacco Use    Smoking status: Never    Smokeless tobacco: Current     Types: Chew    Tobacco comments:     snuff for 45 yrs   Vaping Use    Vaping status: Never Used   Substance and Sexual Activity    Alcohol use: Not Currently    Drug use: Never    Sexual activity: Not Currently     Allergies   Allergen Reactions    Bacitracin Rash    Bacitracin-Neomycin-Polymyxin Rash     Current Outpatient Medications   Medication Sig Dispense Refill    amitriptyline (ELAVIL) 25 MG tablet Take 1-2 tablets by mouth every night at bedtime.      clonazePAM (KlonoPIN) 2 MG tablet Take 1 tablet by mouth 2 (two) times a day.      dapagliflozin Propanediol 10 MG tablet Take 10 mg by mouth Daily.      fenofibrate (TRICOR) 145 MG tablet Take 1 tablet by mouth Daily.      irbesartan (AVAPRO) 75 MG tablet Take 1 tablet by mouth Daily.      lithium 600 MG capsule Take 1 capsule by mouth 2 (Two) Times a Day With Meals.      melatonin 5 MG tablet tablet Take 1 tablet by mouth At Night As Needed.      multivitamin with minerals tablet tablet Take 1 tablet by  mouth Daily.      Omega-3 Fatty Acids (fish oil) 1000 MG capsule capsule Take 2 capsules by mouth Daily.      omeprazole (priLOSEC) 40 MG capsule Take 1 capsule by mouth Daily.      pioglitazone (ACTOS) 30 MG tablet Take 1 tablet by mouth Daily.      psyllium (METAMUCIL) 58.6 % packet Take 1 packet by mouth Daily.      rosuvastatin (CRESTOR) 10 MG tablet Take 2 tablets by mouth Daily.      sertraline (ZOLOFT) 100 MG tablet Take 1 tablet by mouth Daily.      vitamin D (ERGOCALCIFEROL) 1.25 MG (24680 UT) capsule capsule Take 1 capsule by mouth 1 (One) Time Per Week.      ziprasidone (GEODON) 40 MG capsule Take 1 capsule by mouth Every 12 (Twelve) Hours.       No current facility-administered medications for this visit.     Review of Systems   Constitutional:  Negative for chills and fever.   HENT:  Negative for congestion.    Respiratory:  Negative for shortness of breath.    Cardiovascular:  Negative for chest pain and leg swelling.   Gastrointestinal:  Negative for constipation, diarrhea, nausea and vomiting.   Skin:  Negative for wound.        Dry skin. Thickened calluses. Fungal infected toenail   Neurological:  Positive for tremors and weakness. Negative for numbness.   Psychiatric/Behavioral:  Negative for agitation.        OBJECTIVE     Vitals:    08/07/24 1523   Pulse: 76   SpO2: 97%         PHYSICAL EXAM  GEN:   Accompanied by none.     Foot/Ankle Exam    GENERAL  Diabetic foot exam performed    Appearance:  appears stated age and obese  Orientation:  AAOx3  Affect:  appropriate  Assistance:  independent  Right shoe gear: diabetic shoe  Left shoe gear: diabetic shoe    VASCULAR     Right Foot Vascularity   Dorsalis pedis:  2+  Posterior tibial:  2+  Skin temperature:  warm  Edema grading:  Trace  CFT:  3  Pedal hair growth:  Present  Varicosities:  mild varicosities     Left Foot Vascularity   Dorsalis pedis:  2+  Posterior tibial:  2+  Skin temperature:  warm  Edema grading:  Trace  CFT:  3  Pedal hair  growth:  Present  Varicosities:  mild varicosities     NEUROLOGIC     Right Foot Neurologic   Light touch sensation: diminished  Vibratory sensation: diminished  Hot/Cold sensation: normal  Protective Sensation using Wainwright-Walt Monofilament:   Sites intact: 10  Sites tested: 10     Left Foot Neurologic   Light touch sensation: diminished  Vibratory sensation: diminished  Hot/Cold sensation:  normal  Protective Sensation using Wainwright-Walt Monofilament:   Sites intact: 10  Sites tested: 10    MUSCULOSKELETAL     Right Foot Musculoskeletal   Ecchymosis:  none  Tenderness:  callous right foot and toenail problem    Arch:  Normal  Hammertoe:  Second toe, third toe, fourth toe and fifth toe     Left Foot Musculoskeletal   Ecchymosis:  none  Tenderness:  callous left foot and toenail problem  Arch:  Normal  Hammertoe:  Second toe, third toe, fourth toe and fifth toe    MUSCLE STRENGTH     Right Foot Muscle Strength   Foot dorsiflexion:  4+  Foot plantar flexion:  4+  Foot inversion:  4-  Foot eversion:  4-     Left Foot Muscle Strength   Foot dorsiflexion:  4+  Foot plantar flexion:  4+  Foot inversion:  4-  Foot eversion:  4-    RANGE OF MOTION     Right Foot Range of Motion   Foot and ankle ROM within normal limits       Left Foot Range of Motion   Foot and ankle ROM within normal limits      DERMATOLOGIC      Right Foot Dermatologic   Skin  Positive for corn, dryness and skin changes.   Nails  1.  Positive for abnormal thickness and subungual debris.  2.  Positive for elongated, onychomycosis, abnormal thickness, subungual debris and dystrophic nail.  3.  Positive for abnormal thickness and subungual debris.  4.  Positive for abnormal thickness and subungual debris.  5.  Positive for abnormal thickness and subungual debris.     Left Foot Dermatologic   Skin  Positive for corn, dryness and skin changes.   Nails  1.  Positive for abnormal thickness and subungual debris.  2.  Positive for abnormal thickness and  subungual debris.  3.  Positive for abnormal thickness and subungual debris.  4.  Positive for abnormally thick and subungual debris.  5.  Positive for abnormally thick and subungual debris.     Right foot additional comments: Thickened dry skin with fissuring noted to heels and lateral edges of plantar foot     Left foot additional comments: Thickened dry skin with fissuring noted to heels and lateral edges of plantar foot    Image:       RADIOLOGY/NUCLEAR:  No results found.    LABORATORY/CULTURE RESULTS:      PATHOLOGY RESULTS:       ASSESSMENT/PLAN     Diagnoses and all orders for this visit:    1. Onychomycosis (Primary)    2. Thickened nails    3. Stage 3 chronic kidney disease, unspecified whether stage 3a or 3b CKD    4. Controlled type 2 diabetes mellitus with stage 3 chronic kidney disease, without long-term current use of insulin    5. Encounter for diabetic foot exam    6. Weakness of both lower extremities    7. Fissures in skin of both feet        Comprehensive lower extremity examination and evaluation was performed.  Discussed findings and treatment plan including risks, benefits, and treatment options with patient in detail. Patient agreed with treatment plan.  DFE performed at today's visit  Management and control of type 2 DM to be continued per PCP.  After verbal consent obtained, nail(s) x10 debrided of length and thickness with nail nipper without incidence  After verbal consent obtained, calluses x5 pared utilizing dermal curette and/or scalpel without incidence  Patient may maintain nails and calluses at home utilizing emery board or pumice stone between visits as needed  Reviewed at home diabetic foot care including daily foot checks   Discussed several different treatment options for onychomycosis including conservative therapy by keeping the nail trimmed back to shortest point of attatchment and maintaining the nail at that length vs TNA with matrixectomy vs oral and/or topical antifungal  treatments.  Discussed risks vs benefits of oral antifungal therapy with Terbinafine. Do not believe benefit outweighs risk at this point in time.   Patient to remain conservative by keeping nail trimmed.   Rx for Lac Hydrin ordered today for extremely dry feet- instructed to apply twice daily and to use pumice stone to pare down thickness. Moisturize and scrub feet daily. Educated that fissuring or cracks in the skin are entry points for infection.   Patient states he prefers to keep his appointments yearly at this point in time- encouraged patient to call sooner with any questions or concerns.     An After Visit Summary was printed and given to the patient at discharge, including (if requested) any available informative/educational handouts regarding diagnosis, treatment, or medications. All questions were answered to patient/family satisfaction. Should symptoms fail to improve or worsen they agree to call or return to clinic or to go to the Emergency Department. Discussed the importance of following up with any needed screening tests/labs/specialist appointments and any requested follow-up recommended by me today. Importance of maintaining follow-up discussed and patient accepts that missed appointments can delay diagnosis and potentially lead to worsening of conditions.  No follow-ups on file., or sooner if acute issues arise.        This document has been electronically signed by KAELYN Viveros on August 7, 2024 16:15 CDT

## 2024-08-06 ENCOUNTER — TELEPHONE (OUTPATIENT)
Age: 59
End: 2024-08-06
Payer: MEDICAID

## 2024-08-06 NOTE — TELEPHONE ENCOUNTER
Called patient to confirmed appointment for 08/07 @ 1526 with Rachna ART. Patient will be here for appointment.

## 2024-08-07 ENCOUNTER — OFFICE VISIT (OUTPATIENT)
Age: 59
End: 2024-08-07
Payer: MEDICAID

## 2024-08-07 VITALS — BODY MASS INDEX: 39.14 KG/M2 | HEART RATE: 76 BPM | WEIGHT: 289 LBS | HEIGHT: 72 IN | OXYGEN SATURATION: 97 %

## 2024-08-07 DIAGNOSIS — R23.4 FISSURES IN SKIN OF BOTH FEET: ICD-10-CM

## 2024-08-07 DIAGNOSIS — N18.30 STAGE 3 CHRONIC KIDNEY DISEASE, UNSPECIFIED WHETHER STAGE 3A OR 3B CKD: ICD-10-CM

## 2024-08-07 DIAGNOSIS — E11.9 ENCOUNTER FOR DIABETIC FOOT EXAM: ICD-10-CM

## 2024-08-07 DIAGNOSIS — B35.1 ONYCHOMYCOSIS: Primary | ICD-10-CM

## 2024-08-07 DIAGNOSIS — E11.22 CONTROLLED TYPE 2 DIABETES MELLITUS WITH STAGE 3 CHRONIC KIDNEY DISEASE, WITHOUT LONG-TERM CURRENT USE OF INSULIN: ICD-10-CM

## 2024-08-07 DIAGNOSIS — R29.898 WEAKNESS OF BOTH LOWER EXTREMITIES: ICD-10-CM

## 2024-08-07 DIAGNOSIS — N18.30 CONTROLLED TYPE 2 DIABETES MELLITUS WITH STAGE 3 CHRONIC KIDNEY DISEASE, WITHOUT LONG-TERM CURRENT USE OF INSULIN: ICD-10-CM

## 2024-08-07 DIAGNOSIS — L60.2 THICKENED NAILS: ICD-10-CM

## 2024-08-07 RX ORDER — AMMONIUM LACTATE 12 G/100G
LOTION TOPICAL AS NEEDED
Qty: 396 G | Refills: 0 | Status: SHIPPED | OUTPATIENT
Start: 2024-08-07

## 2025-02-11 NOTE — TELEPHONE ENCOUNTER
Requested Prescriptions     Pending Prescriptions Disp Refills    diclofenac sodium (VOLTAREN) 1 % GEL [Pharmacy Med Name: DICLOFENAC SODIUM 1% GEL] 100 g 5     Sig: APPLY 2 G TOPICALLY 4 TIMES A DAY    amitriptyline (ELAVIL) 25 MG tablet [Pharmacy Med Name: AMITRIPTYLINE HCL 25 MG TAB] 180 tablet 2     Sig: TAKE 1 TO 2 TABLETS BY MOUTH AT BEDTIME       Last Office Visit: 6/11/2024  Next Office Visit: 6/16/2025  Last Medication Refill: 6/23/24

## 2025-07-14 ENCOUNTER — TELEPHONE (OUTPATIENT)
Dept: NEUROLOGY | Age: 60
End: 2025-07-14

## 2025-07-14 NOTE — TELEPHONE ENCOUNTER
I have called and left patient a VM to let him know that his appointment for 07-21-25 with Dr. Vo had to be rescheduled due to the provider is out of the office. I have left on VM of when I have patient appointment rescheduled too.

## (undated) DEVICE — THE CHANNEL CLEANING BRUSH IS A NYLON FLEXI BRUSH ATTACHED TO A FLEXIBLE PLASTIC SHEATH DESIGNED TO SAFELY REMOVE DEBRIS FROM FLEXIBLE ENDOSCOPES.

## (undated) DEVICE — Device: Brand: DEFENDO AIR/WATER/SUCTION AND BIOPSY VALVE

## (undated) DEVICE — CONMED SCOPE SAVER BITE BLOCK, 20X27 MM: Brand: SCOPE SAVER

## (undated) DEVICE — YANKAUER,BULB TIP WITH VENT: Brand: ARGYLE

## (undated) DEVICE — CUFF,BP,DISP,1 TUBE,ADULT,HP: Brand: MEDLINE

## (undated) DEVICE — SENSR O2 OXIMAX FNGR A/ 18IN NONSTR

## (undated) DEVICE — FRCP BIOP ENDO CAPTURAPRO SPK SERR 2.8MM 230CM

## (undated) DEVICE — MASK,OXYGEN,MED CONC,ADLT,7' TUB, UC: Brand: PENDING

## (undated) DEVICE — TBG SMPL FLTR LINE NASL 02/C02 A/ BX/100